# Patient Record
Sex: MALE | Race: ASIAN | NOT HISPANIC OR LATINO | URBAN - METROPOLITAN AREA
[De-identification: names, ages, dates, MRNs, and addresses within clinical notes are randomized per-mention and may not be internally consistent; named-entity substitution may affect disease eponyms.]

---

## 2023-08-13 ENCOUNTER — INPATIENT (INPATIENT)
Facility: HOSPITAL | Age: 73
LOS: 4 days | Discharge: HOME | DRG: 661 | End: 2023-08-18
Attending: HOSPITALIST | Admitting: STUDENT IN AN ORGANIZED HEALTH CARE EDUCATION/TRAINING PROGRAM
Payer: MEDICARE

## 2023-08-13 VITALS
WEIGHT: 175.05 LBS | SYSTOLIC BLOOD PRESSURE: 128 MMHG | RESPIRATION RATE: 20 BRPM | HEIGHT: 66 IN | TEMPERATURE: 98 F | DIASTOLIC BLOOD PRESSURE: 80 MMHG | OXYGEN SATURATION: 97 % | HEART RATE: 51 BPM

## 2023-08-13 LAB
ALBUMIN SERPL ELPH-MCNC: 4.9 G/DL — SIGNIFICANT CHANGE UP (ref 3.3–5)
ALP SERPL-CCNC: 80 U/L — SIGNIFICANT CHANGE UP (ref 40–120)
ALT FLD-CCNC: 29 U/L — SIGNIFICANT CHANGE UP (ref 10–45)
ANION GAP SERPL CALC-SCNC: 10 MMOL/L — SIGNIFICANT CHANGE UP (ref 5–17)
ANION GAP SERPL CALC-SCNC: 13 MMOL/L — SIGNIFICANT CHANGE UP (ref 5–17)
APPEARANCE UR: CLEAR — SIGNIFICANT CHANGE UP
AST SERPL-CCNC: 23 U/L — SIGNIFICANT CHANGE UP (ref 10–40)
BACTERIA # UR AUTO: NEGATIVE — SIGNIFICANT CHANGE UP
BASE EXCESS BLDV CALC-SCNC: -0.6 MMOL/L — SIGNIFICANT CHANGE UP (ref -2–3)
BASOPHILS # BLD AUTO: 0.03 K/UL — SIGNIFICANT CHANGE UP (ref 0–0.2)
BASOPHILS NFR BLD AUTO: 0.3 % — SIGNIFICANT CHANGE UP (ref 0–2)
BILIRUB SERPL-MCNC: 0.6 MG/DL — SIGNIFICANT CHANGE UP (ref 0.2–1.2)
BILIRUB UR-MCNC: NEGATIVE — SIGNIFICANT CHANGE UP
BUN SERPL-MCNC: 26 MG/DL — HIGH (ref 7–23)
BUN SERPL-MCNC: 26 MG/DL — HIGH (ref 7–23)
CA-I SERPL-SCNC: 1.35 MMOL/L — HIGH (ref 1.15–1.33)
CALCIUM SERPL-MCNC: 10.4 MG/DL — SIGNIFICANT CHANGE UP (ref 8.4–10.5)
CALCIUM SERPL-MCNC: 8.9 MG/DL — SIGNIFICANT CHANGE UP (ref 8.4–10.5)
CHLORIDE BLDV-SCNC: 105 MMOL/L — SIGNIFICANT CHANGE UP (ref 96–108)
CHLORIDE SERPL-SCNC: 105 MMOL/L — SIGNIFICANT CHANGE UP (ref 96–108)
CHLORIDE SERPL-SCNC: 109 MMOL/L — HIGH (ref 96–108)
CO2 BLDV-SCNC: 28 MMOL/L — HIGH (ref 22–26)
CO2 SERPL-SCNC: 22 MMOL/L — SIGNIFICANT CHANGE UP (ref 22–31)
CO2 SERPL-SCNC: 23 MMOL/L — SIGNIFICANT CHANGE UP (ref 22–31)
COD CRY URNS QL: ABNORMAL
COLOR SPEC: YELLOW — SIGNIFICANT CHANGE UP
CREAT SERPL-MCNC: 1.53 MG/DL — HIGH (ref 0.5–1.3)
CREAT SERPL-MCNC: 1.6 MG/DL — HIGH (ref 0.5–1.3)
DIFF PNL FLD: NEGATIVE — SIGNIFICANT CHANGE UP
EGFR: 45 ML/MIN/1.73M2 — LOW
EGFR: 48 ML/MIN/1.73M2 — LOW
EOSINOPHIL # BLD AUTO: 0.02 K/UL — SIGNIFICANT CHANGE UP (ref 0–0.5)
EOSINOPHIL NFR BLD AUTO: 0.2 % — SIGNIFICANT CHANGE UP (ref 0–6)
EPI CELLS # UR: 1 — SIGNIFICANT CHANGE UP
GAS PNL BLDV: 134 MMOL/L — LOW (ref 136–145)
GAS PNL BLDV: SIGNIFICANT CHANGE UP
GAS PNL BLDV: SIGNIFICANT CHANGE UP
GLUCOSE BLDV-MCNC: 114 MG/DL — HIGH (ref 70–99)
GLUCOSE SERPL-MCNC: 108 MG/DL — HIGH (ref 70–99)
GLUCOSE SERPL-MCNC: 110 MG/DL — HIGH (ref 70–99)
GLUCOSE UR QL: NEGATIVE — SIGNIFICANT CHANGE UP
HCO3 BLDV-SCNC: 27 MMOL/L — SIGNIFICANT CHANGE UP (ref 22–29)
HCT VFR BLD CALC: 47.4 % — SIGNIFICANT CHANGE UP (ref 39–50)
HCT VFR BLDA CALC: 47 % — SIGNIFICANT CHANGE UP (ref 39–51)
HGB BLD CALC-MCNC: 15.8 G/DL — SIGNIFICANT CHANGE UP (ref 12.6–17.4)
HGB BLD-MCNC: 15.5 G/DL — SIGNIFICANT CHANGE UP (ref 13–17)
IMM GRANULOCYTES NFR BLD AUTO: 0.5 % — SIGNIFICANT CHANGE UP (ref 0–0.9)
KETONES UR-MCNC: NEGATIVE — SIGNIFICANT CHANGE UP
LACTATE BLDV-MCNC: 1.4 MMOL/L — SIGNIFICANT CHANGE UP (ref 0.5–2)
LEUKOCYTE ESTERASE UR-ACNC: NEGATIVE — SIGNIFICANT CHANGE UP
LIDOCAIN IGE QN: 46 U/L — SIGNIFICANT CHANGE UP (ref 7–60)
LYMPHOCYTES # BLD AUTO: 0.93 K/UL — LOW (ref 1–3.3)
LYMPHOCYTES # BLD AUTO: 8.6 % — LOW (ref 13–44)
MCHC RBC-ENTMCNC: 30.3 PG — SIGNIFICANT CHANGE UP (ref 27–34)
MCHC RBC-ENTMCNC: 32.7 GM/DL — SIGNIFICANT CHANGE UP (ref 32–36)
MCV RBC AUTO: 92.6 FL — SIGNIFICANT CHANGE UP (ref 80–100)
MONOCYTES # BLD AUTO: 0.95 K/UL — HIGH (ref 0–0.9)
MONOCYTES NFR BLD AUTO: 8.8 % — SIGNIFICANT CHANGE UP (ref 2–14)
NEUTROPHILS # BLD AUTO: 8.78 K/UL — HIGH (ref 1.8–7.4)
NEUTROPHILS NFR BLD AUTO: 81.6 % — HIGH (ref 43–77)
NITRITE UR-MCNC: NEGATIVE — SIGNIFICANT CHANGE UP
NRBC # BLD: 0 /100 WBCS — SIGNIFICANT CHANGE UP (ref 0–0)
PCO2 BLDV: 53 MMHG — SIGNIFICANT CHANGE UP (ref 42–55)
PH BLDV: 7.31 — LOW (ref 7.32–7.43)
PH UR: 5.5 — SIGNIFICANT CHANGE UP (ref 5–8)
PLATELET # BLD AUTO: 211 K/UL — SIGNIFICANT CHANGE UP (ref 150–400)
PO2 BLDV: 21 MMHG — LOW (ref 25–45)
POTASSIUM BLDV-SCNC: 5 MMOL/L — SIGNIFICANT CHANGE UP (ref 3.5–5.1)
POTASSIUM SERPL-MCNC: 4.2 MMOL/L — SIGNIFICANT CHANGE UP (ref 3.5–5.3)
POTASSIUM SERPL-MCNC: 5 MMOL/L — SIGNIFICANT CHANGE UP (ref 3.5–5.3)
POTASSIUM SERPL-SCNC: 4.2 MMOL/L — SIGNIFICANT CHANGE UP (ref 3.5–5.3)
POTASSIUM SERPL-SCNC: 5 MMOL/L — SIGNIFICANT CHANGE UP (ref 3.5–5.3)
PROT SERPL-MCNC: 7.6 G/DL — SIGNIFICANT CHANGE UP (ref 6–8.3)
PROT UR-MCNC: ABNORMAL
RBC # BLD: 5.12 M/UL — SIGNIFICANT CHANGE UP (ref 4.2–5.8)
RBC # FLD: 12.3 % — SIGNIFICANT CHANGE UP (ref 10.3–14.5)
RBC CASTS # UR COMP ASSIST: 0 /HPF — SIGNIFICANT CHANGE UP (ref 0–4)
SAO2 % BLDV: 27.6 % — LOW (ref 67–88)
SODIUM SERPL-SCNC: 141 MMOL/L — SIGNIFICANT CHANGE UP (ref 135–145)
SODIUM SERPL-SCNC: 141 MMOL/L — SIGNIFICANT CHANGE UP (ref 135–145)
SP GR SPEC: 1.03 — HIGH (ref 1.01–1.02)
UROBILINOGEN FLD QL: SIGNIFICANT CHANGE UP
WBC # BLD: 10.76 K/UL — HIGH (ref 3.8–10.5)
WBC # FLD AUTO: 10.76 K/UL — HIGH (ref 3.8–10.5)
WBC UR QL: 1 /HPF — SIGNIFICANT CHANGE UP (ref 0–5)

## 2023-08-13 PROCEDURE — 74176 CT ABD & PELVIS W/O CONTRAST: CPT | Mod: 26,MA

## 2023-08-13 PROCEDURE — 71045 X-RAY EXAM CHEST 1 VIEW: CPT | Mod: 26

## 2023-08-13 PROCEDURE — 99285 EMERGENCY DEPT VISIT HI MDM: CPT

## 2023-08-13 RX ORDER — ONDANSETRON 8 MG/1
4 TABLET, FILM COATED ORAL ONCE
Refills: 0 | Status: COMPLETED | OUTPATIENT
Start: 2023-08-13 | End: 2023-08-13

## 2023-08-13 RX ORDER — ACETAMINOPHEN 500 MG
650 TABLET ORAL ONCE
Refills: 0 | Status: COMPLETED | OUTPATIENT
Start: 2023-08-13 | End: 2023-08-13

## 2023-08-13 RX ORDER — SODIUM CHLORIDE 9 MG/ML
1000 INJECTION INTRAMUSCULAR; INTRAVENOUS; SUBCUTANEOUS ONCE
Refills: 0 | Status: COMPLETED | OUTPATIENT
Start: 2023-08-13 | End: 2023-08-13

## 2023-08-13 RX ORDER — MORPHINE SULFATE 50 MG/1
4 CAPSULE, EXTENDED RELEASE ORAL ONCE
Refills: 0 | Status: DISCONTINUED | OUTPATIENT
Start: 2023-08-13 | End: 2023-08-13

## 2023-08-13 RX ORDER — KETOROLAC TROMETHAMINE 30 MG/ML
15 SYRINGE (ML) INJECTION ONCE
Refills: 0 | Status: DISCONTINUED | OUTPATIENT
Start: 2023-08-13 | End: 2023-08-13

## 2023-08-13 RX ADMIN — SODIUM CHLORIDE 1000 MILLILITER(S): 9 INJECTION INTRAMUSCULAR; INTRAVENOUS; SUBCUTANEOUS at 16:16

## 2023-08-13 RX ADMIN — Medication 15 MILLIGRAM(S): at 16:24

## 2023-08-13 RX ADMIN — SODIUM CHLORIDE 1000 MILLILITER(S): 9 INJECTION INTRAMUSCULAR; INTRAVENOUS; SUBCUTANEOUS at 19:59

## 2023-08-13 RX ADMIN — ONDANSETRON 4 MILLIGRAM(S): 8 TABLET, FILM COATED ORAL at 16:18

## 2023-08-13 RX ADMIN — Medication 650 MILLIGRAM(S): at 16:19

## 2023-08-13 RX ADMIN — MORPHINE SULFATE 4 MILLIGRAM(S): 50 CAPSULE, EXTENDED RELEASE ORAL at 16:23

## 2023-08-13 NOTE — ED ADULT NURSE NOTE - OBJECTIVE STATEMENT
73 year old male presenting to the ED left flank pain for the past 48hrs. states his pain is sharp and constant hes also complaining of  nausea and had 7 episodes of vomiting, last episode was at 3 am this morning. He denies chest pain, SOB, fever, chills, hematuria, dysuria or increased urgency. Language Line Tajik  Jenelle, [phone]

## 2023-08-13 NOTE — ED PROVIDER NOTE - PROGRESS NOTE DETAILS
POCUS exam showed mild hydronephrosis of left kidney suggestive of nephrolithiasis. Results of Non-Contrast CT AP pending. Madonna Alexis MD (PGY-2 EM): discussed with patient need for admission for HERLINDA, renal stone, angy.  june [id number]. patient had an episode of angy, does not know what htn medicine he is on. patient denies history of PE, on an IVC filter for DVTs.

## 2023-08-13 NOTE — ED PROVIDER NOTE - OBJECTIVE STATEMENT
AHSAN is a 73 year old male with past medical history of CAD, HTN, HLD presenting to the ED with 48 hour history of left flank pain. He describes the pain as sharp, was initially intermittent but now is constant. He has not taken anything for the pain and does notice anything that makes it worse. The pain has been associated with nausea and 7 episodes of emesis, last episode 3 am this morning. He denies chest pain, SOB, fever, chills, hematuria, dysuria or increased urgency. He has no history of kidney stone. Does not use alcohol or tobacco products. AHSAN is a 73 year old male with past medical history of CAD, HTN, HLD presenting to the ED with 48 hour history of left flank pain. He describes the pain as sharp, was initially intermittent but now is constant. He has not taken anything for the pain and does notice anything that makes it worse. The pain has been associated with nausea and 7 episodes of emesis, last episode 3 am this morning. He denies chest pain, SOB, fever, chills, hematuria, dysuria or increased urgency. He has no history of kidney stone. Does not use alcohol or tobacco products.  Interview performed with Language Line Mongolian  Jenelle, [phone]

## 2023-08-13 NOTE — ED PROVIDER NOTE - CLINICAL SUMMARY MEDICAL DECISION MAKING FREE TEXT BOX
This is a 73 year old male with past medical history of CAD, HTN and HLD coming to the ED with 48 hours of left flank pain. Symptoms are suggestive of nephrolithiasis at this time, differential diagnosis also includes pyelonephritis vs aortic pathology. Diagnostic workup will include CMP, CBC, urinalysis, urine culture, Retroperitoneal POCUS and Non-Contrast CT Abdomen/Pelvis. 1L bolus IV fluid. IV Toradol 15 mg, IV Morphine 4 mg, PO acetaminophen 650mg for pain control.  IV ondansetron for antiemesis. This is a 73 year old male with past medical history of CAD, HTN and HLD coming to the ED with 48 hours of left flank pain. Symptoms are suggestive of nephrolithiasis at this time, differential diagnosis also includes pyelonephritis vs aortic pathology. Diagnostic workup will include CMP, CBC, urinalysis, urine culture, Retroperitoneal POCUS and Non-Contrast CT Abdomen/Pelvis. 1L bolus IV fluid. IV Toradol 15 mg, IV Morphine 4 mg, PO acetaminophen 650mg for pain control.  IV ondansetron for antiemesis.    Nishant Alejandra MD, FACEP: In this physician's medical judgement based on clinical history and physical exam the patient's signs and symptoms lead to differential diagnoses which includes but is not limited to: ureterolithiases, pyelonephritis, colitis,     Historical features, symptoms, and clinical exam not consistent with: acs, aortic dissection    Labs were ordered and independently reviewed by me.  EKG was ordered and independently reviewed by me.  Imaging was ordered and reviewed by me.      Appropriate medications for the patient's presenting complaints were ordered, and effects were reassessed.     History assisted by   Escalation to admission/observation was considered.     Will follow up on labs, therapeutics, imaging, reassess and disposition as clinically indicated.  *The above represents an initial assessment/impression. Please refer to my progress notes below for potential changes in patient clinical course*

## 2023-08-13 NOTE — ED PROVIDER NOTE - NS ED ROS FT
CONSTITUTIONAL - No fever, No weight change, No lightheadedness  SKIN - No rash  HEMATOLOGIC - No abnormal bleeding or bruising  EYES - No eye pain, No blurred vision  ENT - No change in hearing, No sore throat, No neck pain, No rhinorrhea, No ear pain  RESPIRATORY - No shortness of breath, No cough  CARDIAC -No chest pain, No palpitations  GI - No abdominal pain, + vomiting, No diarrhea, No constipation  - No dysuria, no frequency, no hematuria.   MUSCULOSKELETAL - No joint pain, No swelling, + back pain  NEUROLOGIC - No numbness, No focal weakness, No headache, No dizziness

## 2023-08-13 NOTE — ED PROVIDER NOTE - CARE PLAN
1 Principal Discharge DX:	Kidney stone  Secondary Diagnosis:	HERLINDA (acute kidney injury)  Secondary Diagnosis:	Bradycardia

## 2023-08-14 ENCOUNTER — TRANSCRIPTION ENCOUNTER (OUTPATIENT)
Age: 73
End: 2023-08-14

## 2023-08-14 DIAGNOSIS — N17.9 ACUTE KIDNEY FAILURE, UNSPECIFIED: ICD-10-CM

## 2023-08-14 DIAGNOSIS — N20.0 CALCULUS OF KIDNEY: ICD-10-CM

## 2023-08-14 DIAGNOSIS — R00.1 BRADYCARDIA, UNSPECIFIED: ICD-10-CM

## 2023-08-14 DIAGNOSIS — Z95.828 PRESENCE OF OTHER VASCULAR IMPLANTS AND GRAFTS: Chronic | ICD-10-CM

## 2023-08-14 DIAGNOSIS — I10 ESSENTIAL (PRIMARY) HYPERTENSION: ICD-10-CM

## 2023-08-14 DIAGNOSIS — Z29.9 ENCOUNTER FOR PROPHYLACTIC MEASURES, UNSPECIFIED: ICD-10-CM

## 2023-08-14 LAB
ANION GAP SERPL CALC-SCNC: 10 MMOL/L — SIGNIFICANT CHANGE UP (ref 5–17)
APPEARANCE UR: ABNORMAL
BACTERIA # UR AUTO: NEGATIVE — SIGNIFICANT CHANGE UP
BASOPHILS # BLD AUTO: 0.02 K/UL — SIGNIFICANT CHANGE UP (ref 0–0.2)
BASOPHILS NFR BLD AUTO: 0.2 % — SIGNIFICANT CHANGE UP (ref 0–2)
BILIRUB UR-MCNC: NEGATIVE — SIGNIFICANT CHANGE UP
BUN SERPL-MCNC: 26 MG/DL — HIGH (ref 7–23)
CALCIUM SERPL-MCNC: 8.6 MG/DL — SIGNIFICANT CHANGE UP (ref 8.4–10.5)
CHLORIDE SERPL-SCNC: 111 MMOL/L — HIGH (ref 96–108)
CO2 SERPL-SCNC: 18 MMOL/L — LOW (ref 22–31)
COLOR SPEC: YELLOW — SIGNIFICANT CHANGE UP
CREAT ?TM UR-MCNC: 237 MG/DL — SIGNIFICANT CHANGE UP
CREAT SERPL-MCNC: 1.58 MG/DL — HIGH (ref 0.5–1.3)
CULTURE RESULTS: SIGNIFICANT CHANGE UP
DIFF PNL FLD: ABNORMAL
EGFR: 46 ML/MIN/1.73M2 — LOW
EOSINOPHIL # BLD AUTO: 0.06 K/UL — SIGNIFICANT CHANGE UP (ref 0–0.5)
EOSINOPHIL NFR BLD AUTO: 0.6 % — SIGNIFICANT CHANGE UP (ref 0–6)
EPI CELLS # UR: 2 /HPF — SIGNIFICANT CHANGE UP
GLUCOSE SERPL-MCNC: 79 MG/DL — SIGNIFICANT CHANGE UP (ref 70–99)
GLUCOSE UR QL: NEGATIVE — SIGNIFICANT CHANGE UP
GRAN CASTS # UR COMP ASSIST: 2 /LPF — SIGNIFICANT CHANGE UP
HCT VFR BLD CALC: 40.6 % — SIGNIFICANT CHANGE UP (ref 39–50)
HGB BLD-MCNC: 13.3 G/DL — SIGNIFICANT CHANGE UP (ref 13–17)
HYALINE CASTS # UR AUTO: 6 /LPF — SIGNIFICANT CHANGE UP (ref 0–7)
IMM GRANULOCYTES NFR BLD AUTO: 0.4 % — SIGNIFICANT CHANGE UP (ref 0–0.9)
KETONES UR-MCNC: NEGATIVE — SIGNIFICANT CHANGE UP
LEUKOCYTE ESTERASE UR-ACNC: ABNORMAL
LYMPHOCYTES # BLD AUTO: 1.27 K/UL — SIGNIFICANT CHANGE UP (ref 1–3.3)
LYMPHOCYTES # BLD AUTO: 13.6 % — SIGNIFICANT CHANGE UP (ref 13–44)
MAGNESIUM SERPL-MCNC: 2.2 MG/DL — SIGNIFICANT CHANGE UP (ref 1.6–2.6)
MCHC RBC-ENTMCNC: 30.6 PG — SIGNIFICANT CHANGE UP (ref 27–34)
MCHC RBC-ENTMCNC: 32.8 GM/DL — SIGNIFICANT CHANGE UP (ref 32–36)
MCV RBC AUTO: 93.3 FL — SIGNIFICANT CHANGE UP (ref 80–100)
MONOCYTES # BLD AUTO: 0.91 K/UL — HIGH (ref 0–0.9)
MONOCYTES NFR BLD AUTO: 9.8 % — SIGNIFICANT CHANGE UP (ref 2–14)
NEUTROPHILS # BLD AUTO: 7.03 K/UL — SIGNIFICANT CHANGE UP (ref 1.8–7.4)
NEUTROPHILS NFR BLD AUTO: 75.4 % — SIGNIFICANT CHANGE UP (ref 43–77)
NITRITE UR-MCNC: NEGATIVE — SIGNIFICANT CHANGE UP
NRBC # BLD: 0 /100 WBCS — SIGNIFICANT CHANGE UP (ref 0–0)
OSMOLALITY UR: 565 MOS/KG — SIGNIFICANT CHANGE UP (ref 300–900)
PH UR: 5.5 — SIGNIFICANT CHANGE UP (ref 5–8)
PHOSPHATE SERPL-MCNC: 2.3 MG/DL — LOW (ref 2.5–4.5)
PLATELET # BLD AUTO: 160 K/UL — SIGNIFICANT CHANGE UP (ref 150–400)
POTASSIUM SERPL-MCNC: 4.4 MMOL/L — SIGNIFICANT CHANGE UP (ref 3.5–5.3)
POTASSIUM SERPL-SCNC: 4.4 MMOL/L — SIGNIFICANT CHANGE UP (ref 3.5–5.3)
POTASSIUM UR-SCNC: 62 MMOL/L — SIGNIFICANT CHANGE UP
PROT ?TM UR-MCNC: 34 MG/DL — HIGH (ref 0–12)
PROT UR-MCNC: ABNORMAL
PROT/CREAT UR-RTO: 0.1 RATIO — SIGNIFICANT CHANGE UP (ref 0–0.2)
RBC # BLD: 4.35 M/UL — SIGNIFICANT CHANGE UP (ref 4.2–5.8)
RBC # FLD: 12.4 % — SIGNIFICANT CHANGE UP (ref 10.3–14.5)
RBC CASTS # UR COMP ASSIST: 44 /HPF — HIGH (ref 0–4)
SODIUM SERPL-SCNC: 139 MMOL/L — SIGNIFICANT CHANGE UP (ref 135–145)
SODIUM UR-SCNC: 40 MMOL/L — SIGNIFICANT CHANGE UP
SP GR SPEC: 1.02 — SIGNIFICANT CHANGE UP (ref 1.01–1.02)
SPECIMEN SOURCE: SIGNIFICANT CHANGE UP
TROPONIN T, HIGH SENSITIVITY RESULT: 13 NG/L — SIGNIFICANT CHANGE UP (ref 0–51)
UROBILINOGEN FLD QL: NEGATIVE — SIGNIFICANT CHANGE UP
UUN UR-MCNC: 794 MG/DL — SIGNIFICANT CHANGE UP
WBC # BLD: 9.33 K/UL — SIGNIFICANT CHANGE UP (ref 3.8–10.5)
WBC # FLD AUTO: 9.33 K/UL — SIGNIFICANT CHANGE UP (ref 3.8–10.5)
WBC UR QL: 8 /HPF — HIGH (ref 0–5)

## 2023-08-14 PROCEDURE — 99221 1ST HOSP IP/OBS SF/LOW 40: CPT

## 2023-08-14 PROCEDURE — 76775 US EXAM ABDO BACK WALL LIM: CPT | Mod: 26

## 2023-08-14 PROCEDURE — 99222 1ST HOSP IP/OBS MODERATE 55: CPT

## 2023-08-14 RX ORDER — HEPARIN SODIUM 5000 [USP'U]/ML
5000 INJECTION INTRAVENOUS; SUBCUTANEOUS EVERY 8 HOURS
Refills: 0 | Status: DISCONTINUED | OUTPATIENT
Start: 2023-08-14 | End: 2023-08-15

## 2023-08-14 RX ORDER — SODIUM CHLORIDE 9 MG/ML
1000 INJECTION INTRAMUSCULAR; INTRAVENOUS; SUBCUTANEOUS
Refills: 0 | Status: DISCONTINUED | OUTPATIENT
Start: 2023-08-14 | End: 2023-08-14

## 2023-08-14 RX ORDER — SODIUM CHLORIDE 9 MG/ML
1000 INJECTION INTRAMUSCULAR; INTRAVENOUS; SUBCUTANEOUS
Refills: 0 | Status: DISCONTINUED | OUTPATIENT
Start: 2023-08-14 | End: 2023-08-15

## 2023-08-14 RX ORDER — OXYCODONE HYDROCHLORIDE 5 MG/1
2.5 TABLET ORAL EVERY 6 HOURS
Refills: 0 | Status: DISCONTINUED | OUTPATIENT
Start: 2023-08-14 | End: 2023-08-14

## 2023-08-14 RX ORDER — ACETAMINOPHEN 500 MG
975 TABLET ORAL EVERY 6 HOURS
Refills: 0 | Status: DISCONTINUED | OUTPATIENT
Start: 2023-08-14 | End: 2023-08-14

## 2023-08-14 RX ORDER — OXYCODONE HYDROCHLORIDE 5 MG/1
2.5 TABLET ORAL EVERY 4 HOURS
Refills: 0 | Status: DISCONTINUED | OUTPATIENT
Start: 2023-08-14 | End: 2023-08-15

## 2023-08-14 RX ORDER — ATORVASTATIN CALCIUM 80 MG/1
40 TABLET, FILM COATED ORAL DAILY
Refills: 0 | Status: DISCONTINUED | OUTPATIENT
Start: 2023-08-14 | End: 2023-08-15

## 2023-08-14 RX ORDER — ACETAMINOPHEN 500 MG
975 TABLET ORAL EVERY 6 HOURS
Refills: 0 | Status: DISCONTINUED | OUTPATIENT
Start: 2023-08-14 | End: 2023-08-15

## 2023-08-14 RX ORDER — OXYCODONE HYDROCHLORIDE 5 MG/1
5 TABLET ORAL EVERY 4 HOURS
Refills: 0 | Status: DISCONTINUED | OUTPATIENT
Start: 2023-08-14 | End: 2023-08-15

## 2023-08-14 RX ORDER — TAMSULOSIN HYDROCHLORIDE 0.4 MG/1
0.4 CAPSULE ORAL AT BEDTIME
Refills: 0 | Status: DISCONTINUED | OUTPATIENT
Start: 2023-08-14 | End: 2023-08-14

## 2023-08-14 RX ORDER — TAMSULOSIN HYDROCHLORIDE 0.4 MG/1
0.8 CAPSULE ORAL AT BEDTIME
Refills: 0 | Status: DISCONTINUED | OUTPATIENT
Start: 2023-08-14 | End: 2023-08-15

## 2023-08-14 RX ADMIN — OXYCODONE HYDROCHLORIDE 2.5 MILLIGRAM(S): 5 TABLET ORAL at 11:20

## 2023-08-14 RX ADMIN — SODIUM CHLORIDE 150 MILLILITER(S): 9 INJECTION INTRAMUSCULAR; INTRAVENOUS; SUBCUTANEOUS at 14:07

## 2023-08-14 RX ADMIN — HEPARIN SODIUM 5000 UNIT(S): 5000 INJECTION INTRAVENOUS; SUBCUTANEOUS at 14:15

## 2023-08-14 RX ADMIN — SODIUM CHLORIDE 150 MILLILITER(S): 9 INJECTION INTRAMUSCULAR; INTRAVENOUS; SUBCUTANEOUS at 05:22

## 2023-08-14 RX ADMIN — OXYCODONE HYDROCHLORIDE 2.5 MILLIGRAM(S): 5 TABLET ORAL at 20:40

## 2023-08-14 RX ADMIN — HEPARIN SODIUM 5000 UNIT(S): 5000 INJECTION INTRAVENOUS; SUBCUTANEOUS at 22:42

## 2023-08-14 RX ADMIN — OXYCODONE HYDROCHLORIDE 5 MILLIGRAM(S): 5 TABLET ORAL at 23:22

## 2023-08-14 RX ADMIN — ATORVASTATIN CALCIUM 40 MILLIGRAM(S): 80 TABLET, FILM COATED ORAL at 14:11

## 2023-08-14 RX ADMIN — OXYCODONE HYDROCHLORIDE 2.5 MILLIGRAM(S): 5 TABLET ORAL at 10:51

## 2023-08-14 RX ADMIN — OXYCODONE HYDROCHLORIDE 2.5 MILLIGRAM(S): 5 TABLET ORAL at 21:40

## 2023-08-14 RX ADMIN — OXYCODONE HYDROCHLORIDE 5 MILLIGRAM(S): 5 TABLET ORAL at 14:06

## 2023-08-14 RX ADMIN — OXYCODONE HYDROCHLORIDE 5 MILLIGRAM(S): 5 TABLET ORAL at 14:35

## 2023-08-14 RX ADMIN — HEPARIN SODIUM 5000 UNIT(S): 5000 INJECTION INTRAVENOUS; SUBCUTANEOUS at 05:50

## 2023-08-14 RX ADMIN — TAMSULOSIN HYDROCHLORIDE 0.8 MILLIGRAM(S): 0.4 CAPSULE ORAL at 22:42

## 2023-08-14 NOTE — PROGRESS NOTE ADULT - SUBJECTIVE AND OBJECTIVE BOX
Subjectiv  Patient seen and examined with Kinyarwanda . Pain better controlled with medication. Denies nausea or vomiting.    Objective    Vital signs  T(F): , Max: 98.2 (08-14-23 @ 12:26)  HR: 58 (08-14-23 @ 12:26)  BP: 137/74 (08-14-23 @ 12:26)  SpO2: 95% (08-14-23 @ 12:26)  Wt(kg): --    Output       Gen: NAD  Abd: soft, nontender, nondistended, mild L CVA tenderness  : Voiding spontaneously      Labs      08-14 @ 13:47    WBC 9.33  / Hct 40.6  / SCr 1.58     08-13 @ 21:16    WBC --    / Hct --    / SCr 1.53

## 2023-08-14 NOTE — CONSULT NOTE ADULT - ASSESSMENT
73y Male past medical history of CAD, HTN, HLD presenting to the ED with 48 hour history of left flank pain, found to have 3mm L distal stone with mild hydro and HERLINDA.  -Trial of medical expulsive therapy  -Flomax  -analgesia/anti-emetics prn  -hydration  -Strain urine for calculi  -please make patient npo in case procedure is indicated  -trend Cr in AM  -f/u urine culture

## 2023-08-14 NOTE — PROGRESS NOTE ADULT - ASSESSMENT
73y Male past medical history of CAD, HTN, HLD presenting to the ED with 48 hour history of left flank pain, found to have 3mm L distal stone with mild hydro and HERLINDA.    - Trend Cr - please obtain morning labs  - Added on for L ureteral stent placement vs. ureteroscopy tomorrow  - Continue Flomax  - Analgesia/anti-emetics prn  - Hydration  - Strain urine for calculi  - NPO after midnight  - F/u urine culture

## 2023-08-14 NOTE — H&P ADULT - NSHPLABSRESULTS_GEN_ALL_CORE
141  |  109<H>  |  26<H>  ----------------------------<  108<H>  4.2   |  22  |  1.53<H>      141  |  105  |  26<H>  ----------------------------<  110<H>  5.0   |  23  |  1.60<H>    Ca    8.9      13 Aug 2023 21:16  Ca    10.4      13 Aug 2023 16:33  Mg     2.4         TPro  7.6  /  Alb  4.9  /  TBili  0.6  /  DBili  x   /  AST  23  /  ALT  29  /  AlkPhos  80      Magnesium: 2.4 mg/dL (23 @ 21:16)                      Urinalysis Basic - ( 14 Aug 2023 03:21 )    Color: Yellow / Appearance: Slightly Turbid / S.022 / pH: x  Gluc: x / Ketone: Negative  / Bili: Negative / Urobili: Negative   Blood: x / Protein: Trace / Nitrite: Negative   Leuk Esterase: Small / RBC: 44 /hpf / WBC 8 /HPF   Sq Epi: x / Non Sq Epi: x / Bacteria: Negative                              15.5   10.76 )-----------( 211      ( 13 Aug 2023 16:33 )             47.4     CAPILLARY BLOOD GLUCOSE        Blood Gas Source Venous: Venous (23 @ 16:27)    IMAGING    < from: CT Abdomen and Pelvis No Cont (23 @ 23:38) >    FINDINGS:  LOWER CHEST: Scattered areas of subsegmental atelectasis bilaterally.   Aortic valvular calcifications.    Evaluation abdominopelvic viscera is limited without the administration   of IV contrast.    LIVER: Steatosis.  BILE DUCTS: Normal caliber.  GALLBLADDER: Cholelithiasis.  SPLEEN: Within normal limits.  PANCREAS: Punctate calcification in the pancreatic head.  ADRENALS: Within normal limits.  KIDNEYS/URETERS: Mild left hydroureteronephrosis secondary to a 3 mm   calculus within the distal left ureter, just proximal to the   ureterovesicular junction. Mild left perinephric stranding. No additional   nephrolithiasis or right-sided hydronephrosis.    BLADDER: Within normal limits.  REPRODUCTIVE ORGANS: Prostate is enlarged.    BOWEL: No bowel obstruction. Appendix is normal.  PERITONEUM: No ascites.  VESSELS: Left common iliac stent extends to level of the IVC. Infrarenal   IVC filter. Mild atherosclerotic disease.  RETROPERITONEUM/LYMPH NODES: No lymphadenopathy.  ABDOMINAL WALL: Within normal limits.  BONES: Degenerative changes.    IMPRESSION:    3 mm obstructing distal left ureteral calculus with associated mild left   hydroureteronephrosis.    < end of copied text >

## 2023-08-14 NOTE — H&P ADULT - PROBLEM SELECTOR PLAN 2
SCr 1.60 --> 1.53, likely due to obstructing stone vs hypovolemia from multiple episodes of vomiting prior.   - Trend BMP, SCr  - Avoid nephrotoxic meds, dose medications renally  - Monitor I/Os

## 2023-08-14 NOTE — H&P ADULT - PROBLEM SELECTOR PLAN 4
Hx of hypertension. Unclear which medications patient takes at home   - Per surescripts review, patient seems to be taking enalapril Hx of hypertension. Unclear which medications patient takes at home   - Per surescripts review, patient seems to be taking enalapril  - BP currently appropriate. Can monitor off antihypertensives for now until med rec complete.

## 2023-08-14 NOTE — H&P ADULT - ASSESSMENT
73 M, German speaking, hx of HLD, HTN, gout, DVT s/p IVC filter, presenting with L flank pain, found to have 3mm obstructing stone in distal L ureter with mild L hydronephrosis. Pt admitted for pain control and management of HERLINDA 2/2 obstructing stone.

## 2023-08-14 NOTE — H&P ADULT - HISTORY OF PRESENT ILLNESS
73 M, Danish speaking, hx of HLD, HTN, gout, presenting to ED with complaints of L flank pain for the past 2 days. Pain is sharp and constant, associated with nausea and several episodes of vomiting. Last episode of vomiting was yesterday AM. No blood in vomit. Denies fevers, chest pain, SOB, constipation, diarrhea. lightheadedness. Pt denies smoking or alcohol use. Pt has a history of gout and takes medications for it, but does not remember the names. Gout is localized to big toe on R foot where he has swelling and tenderness of that first joint. No previous instances of kidney stones. Pt says in 2015, he was hospitalized for "blood clots" and required "vein surgery". He is not currently on any blood thinners. Pain is now improved from prior, no longer nauseous.     In ED, received morphine, ketorolac, tylenol for pain control; zofran for nausea. CT A/P showed 3mm obstructing stone in distal ureter with associated L mild hydronephrosis.  73 M, Urdu speaking, hx of HLD, HTN, gout, DVT s/p IVC filter, presenting to ED with complaints of L flank pain for the past 2 days. Pain is sharp and constant, associated with nausea and several episodes of vomiting. Last episode of vomiting was yesterday AM. No blood in vomit. Denies fevers, chest pain, SOB, constipation, diarrhea. lightheadedness. Pt denies smoking or alcohol use. Pt has a history of gout and takes medications for it, but does not remember the names. Gout is localized to big toe on R foot where he has swelling and tenderness of that first joint. No previous instances of kidney stones. Pt says in 2015, he was hospitalized for "blood clots" and required "vein surgery". He is not currently on any blood thinners. Pain is now improved from prior, no longer nauseous.     In ED, received morphine, ketorolac, tylenol for pain control; zofran for nausea. CT A/P showed 3mm obstructing stone in distal ureter with associated L mild hydronephrosis.

## 2023-08-14 NOTE — H&P ADULT - NSHPREVIEWOFSYSTEMS_GEN_ALL_CORE
Review of Systems:   CONSTITUTIONAL: No fever, weight loss  EYES: No eye pain, visual disturbances, or discharge  ENMT:  No difficulty hearing, tinnitus, vertigo; No sinus or throat pain  RESPIRATORY: No SOB. No cough, wheezing, chills or hemoptysis  CARDIOVASCULAR: No chest pain, palpitations, dizziness, or leg swelling  GASTROINTESTINAL: L flank pain. + nausea, vomiting (improved), No hematemesis; No diarrhea or constipation. No melena or hematochezia.  GENITOURINARY: No dysuria, frequency, hematuria, or incontinence  NEUROLOGICAL: No headaches, memory loss, loss of strength, numbness, or tremors  SKIN: No itching, burning, rashes, or lesions   MUSCULOSKELETAL: +joint pain in base of first toe on R foot with swelling; No muscle, back pain  HEME/LYMPH: No easy bruising, or bleeding gums

## 2023-08-14 NOTE — H&P ADULT - PROBLEM SELECTOR PLAN 1
CT findings show obstructing 3mm stone with mild hydronephrosis.  - Initial UA showing few calcium oxalate crystals; repeat UA showing large blood   - Urology consulted given obstructing stone and HERLINDA. Discussed with urology; plan for medical expulsive therapy  - IVF hydration  - Start flomax  - Will keep NPO for now in case need for urological intervention  - Pain control with tylenol. Oxycodone if severe. Avoid NSAIDs, morphine for now given HERLINDA.

## 2023-08-14 NOTE — H&P ADULT - PROBLEM SELECTOR PLAN 5
DVT ppx: heparin subq  Diet: NPO except meds in case possible procedure; Resume DASH diet when cleared.  Dispo: pending improvement; not anticipating any home or PT needs    Medication reconciliation is incomplete. Need to confirm medications with pharmacy. [facility] [phone].

## 2023-08-14 NOTE — PATIENT PROFILE ADULT - FALL HARM RISK - HARM RISK INTERVENTIONS

## 2023-08-14 NOTE — H&P ADULT - PROBLEM SELECTOR PLAN 3
Pt bradycardic to HR 49 at times. Asymptomatic. Not on any silke blocking agents.   - EKG personally reviewed: showing sinus bradycardia, HR 49. No evidence of heart block, No ST or T wave abnormalities.   - monitor on telemetry

## 2023-08-14 NOTE — H&P ADULT - NSICDXPASTMEDICALHX_GEN_ALL_CORE_FT
PAST MEDICAL HISTORY:  CAD (coronary artery disease)     Gout     HTN (hypertension)     Hyperlipemia

## 2023-08-14 NOTE — H&P ADULT - NSHPPHYSICALEXAM_GEN_ALL_CORE
Vital Signs Last 24 Hrs  T(C): 36.7 (13 Aug 2023 19:40), Max: 36.9 (13 Aug 2023 14:17)  T(F): 98 (13 Aug 2023 19:40), Max: 98.5 (13 Aug 2023 14:17)  HR: 50 (14 Aug 2023 02:32) (47 - 54)  BP: 141/82 (14 Aug 2023 02:32) (128/80 - 141/82)  BP(mean): 78 (13 Aug 2023 17:30) (78 - 83)  RR: 16 (14 Aug 2023 02:32) (16 - 20)  SpO2: 99% (14 Aug 2023 02:32) (97% - 100%)    Parameters below as of 14 Aug 2023 02:32  Patient On (Oxygen Delivery Method): room air        CONSTITUTIONAL: Well-groomed, in no apparent distress  EYES: No conjunctival or scleral injection, non-icteric; PERRLA and symmetric  ENMT: No external nasal lesions; nasal mucosa not inflamed; normal dentition; no pharyngeal injection or exudates, oral mucosa with moist membranes  RESPIRATORY: Breathing comfortably; lungs CTA without wheeze/rhonchi/rales  CARDIOVASCULAR: bradycardic,+S1S2, no M/G/R; no carotid bruits; pedal pulses full and symmetric; trace lower extremity edema  GASTROINTESTINAL: No palpable masses or tenderness, +BS throughout, no rebound/guarding; no hepatosplenomegaly; no hernia palpated, mild tenderness of L flank  LYMPHATIC: No cervical LAD or tenderness  MUSCULOSKELETAL: Swelling and tenderness of first metatarsal joint, no fluctuance or erythema. No malalignment of extremities. No other joint swellings  SKIN: No rashes or ulcers noted  NEUROLOGIC: CN grossly intact; sensation intact in LEs b/l to light touch; strength and tone normal  PSYCHIATRIC: A+O x 3

## 2023-08-14 NOTE — H&P ADULT - NSHPSOCIALHISTORY_GEN_ALL_CORE
Social History:    Marital Status:  ( X  )    (   ) Single    (   )    (  )   Occupation: n/a  Lives with: (  ) alone  (  ) children   ( X ) spouse   (  ) parents  (  ) other    Substance Use (street drugs): ( X ) never used  (  ) other:  Tobacco Usage:  ( X  ) never smoked   (   ) former smoker   (   ) current smoker  (     ) pack year  (        ) last cigarette date  Alcohol Usage: none

## 2023-08-15 DIAGNOSIS — I25.10 ATHEROSCLEROTIC HEART DISEASE OF NATIVE CORONARY ARTERY WITHOUT ANGINA PECTORIS: ICD-10-CM

## 2023-08-15 LAB
ANION GAP SERPL CALC-SCNC: 10 MMOL/L — SIGNIFICANT CHANGE UP (ref 5–17)
BUN SERPL-MCNC: 23 MG/DL — SIGNIFICANT CHANGE UP (ref 7–23)
CALCIUM SERPL-MCNC: 8.4 MG/DL — SIGNIFICANT CHANGE UP (ref 8.4–10.5)
CHLORIDE SERPL-SCNC: 110 MMOL/L — HIGH (ref 96–108)
CO2 SERPL-SCNC: 19 MMOL/L — LOW (ref 22–31)
CREAT SERPL-MCNC: 1.64 MG/DL — HIGH (ref 0.5–1.3)
EGFR: 44 ML/MIN/1.73M2 — LOW
GLUCOSE SERPL-MCNC: 75 MG/DL — SIGNIFICANT CHANGE UP (ref 70–99)
HCT VFR BLD CALC: 41 % — SIGNIFICANT CHANGE UP (ref 39–50)
HCV AB S/CO SERPL IA: 0.06 S/CO — SIGNIFICANT CHANGE UP (ref 0–0.99)
HCV AB SERPL-IMP: SIGNIFICANT CHANGE UP
HGB BLD-MCNC: 13 G/DL — SIGNIFICANT CHANGE UP (ref 13–17)
MCHC RBC-ENTMCNC: 30 PG — SIGNIFICANT CHANGE UP (ref 27–34)
MCHC RBC-ENTMCNC: 31.7 GM/DL — LOW (ref 32–36)
MCV RBC AUTO: 94.7 FL — SIGNIFICANT CHANGE UP (ref 80–100)
NRBC # BLD: 0 /100 WBCS — SIGNIFICANT CHANGE UP (ref 0–0)
PLATELET # BLD AUTO: 169 K/UL — SIGNIFICANT CHANGE UP (ref 150–400)
POTASSIUM SERPL-MCNC: 4.7 MMOL/L — SIGNIFICANT CHANGE UP (ref 3.5–5.3)
POTASSIUM SERPL-SCNC: 4.7 MMOL/L — SIGNIFICANT CHANGE UP (ref 3.5–5.3)
RBC # BLD: 4.33 M/UL — SIGNIFICANT CHANGE UP (ref 4.2–5.8)
RBC # FLD: 11.9 % — SIGNIFICANT CHANGE UP (ref 10.3–14.5)
SODIUM SERPL-SCNC: 139 MMOL/L — SIGNIFICANT CHANGE UP (ref 135–145)
WBC # BLD: 7.15 K/UL — SIGNIFICANT CHANGE UP (ref 3.8–10.5)
WBC # FLD AUTO: 7.15 K/UL — SIGNIFICANT CHANGE UP (ref 3.8–10.5)

## 2023-08-15 PROCEDURE — 99233 SBSQ HOSP IP/OBS HIGH 50: CPT

## 2023-08-15 PROCEDURE — 74420 UROGRAPHY RTRGR +-KUB: CPT | Mod: 26

## 2023-08-15 PROCEDURE — 52332 CYSTOSCOPY AND TREATMENT: CPT | Mod: LT

## 2023-08-15 DEVICE — STENT URET INLAY OPTIMA 6FRX24CM: Type: IMPLANTABLE DEVICE | Site: LEFT | Status: FUNCTIONAL

## 2023-08-15 DEVICE — GUIDEWIRE SENSOR DUAL-FLEX NITINOL STRAIGHT .035" X 150CM: Type: IMPLANTABLE DEVICE | Site: LEFT | Status: FUNCTIONAL

## 2023-08-15 DEVICE — URETERAL CATH OPEN END 5FR 70CM: Type: IMPLANTABLE DEVICE | Site: LEFT | Status: FUNCTIONAL

## 2023-08-15 RX ORDER — OXYBUTYNIN CHLORIDE 5 MG
5 TABLET ORAL ONCE
Refills: 0 | Status: COMPLETED | OUTPATIENT
Start: 2023-08-15 | End: 2023-08-15

## 2023-08-15 RX ORDER — OXYCODONE HYDROCHLORIDE 5 MG/1
5 TABLET ORAL EVERY 4 HOURS
Refills: 0 | Status: DISCONTINUED | OUTPATIENT
Start: 2023-08-15 | End: 2023-08-18

## 2023-08-15 RX ORDER — DIAZEPAM 5 MG
2 TABLET ORAL ONCE
Refills: 0 | Status: DISCONTINUED | OUTPATIENT
Start: 2023-08-15 | End: 2023-08-18

## 2023-08-15 RX ORDER — ACETAMINOPHEN 500 MG
975 TABLET ORAL EVERY 6 HOURS
Refills: 0 | Status: DISCONTINUED | OUTPATIENT
Start: 2023-08-15 | End: 2023-08-18

## 2023-08-15 RX ORDER — CEFTRIAXONE 500 MG/1
1000 INJECTION, POWDER, FOR SOLUTION INTRAMUSCULAR; INTRAVENOUS EVERY 24 HOURS
Refills: 0 | Status: DISCONTINUED | OUTPATIENT
Start: 2023-08-16 | End: 2023-08-18

## 2023-08-15 RX ORDER — TAMSULOSIN HYDROCHLORIDE 0.4 MG/1
0.4 CAPSULE ORAL AT BEDTIME
Refills: 0 | Status: DISCONTINUED | OUTPATIENT
Start: 2023-08-15 | End: 2023-08-18

## 2023-08-15 RX ORDER — CHLORHEXIDINE GLUCONATE 213 G/1000ML
1 SOLUTION TOPICAL
Refills: 0 | Status: DISCONTINUED | OUTPATIENT
Start: 2023-08-15 | End: 2023-08-15

## 2023-08-15 RX ORDER — OXYCODONE HYDROCHLORIDE 5 MG/1
5 TABLET ORAL ONCE
Refills: 0 | Status: DISCONTINUED | OUTPATIENT
Start: 2023-08-15 | End: 2023-08-15

## 2023-08-15 RX ORDER — CEFTRIAXONE 500 MG/1
INJECTION, POWDER, FOR SOLUTION INTRAMUSCULAR; INTRAVENOUS
Refills: 0 | Status: DISCONTINUED | OUTPATIENT
Start: 2023-08-15 | End: 2023-08-18

## 2023-08-15 RX ORDER — HEPARIN SODIUM 5000 [USP'U]/ML
5000 INJECTION INTRAVENOUS; SUBCUTANEOUS EVERY 8 HOURS
Refills: 0 | Status: DISCONTINUED | OUTPATIENT
Start: 2023-08-15 | End: 2023-08-18

## 2023-08-15 RX ORDER — FENTANYL CITRATE 50 UG/ML
25 INJECTION INTRAVENOUS
Refills: 0 | Status: DISCONTINUED | OUTPATIENT
Start: 2023-08-15 | End: 2023-08-15

## 2023-08-15 RX ORDER — CEFTRIAXONE 500 MG/1
1000 INJECTION, POWDER, FOR SOLUTION INTRAMUSCULAR; INTRAVENOUS ONCE
Refills: 0 | Status: COMPLETED | OUTPATIENT
Start: 2023-08-15 | End: 2023-08-15

## 2023-08-15 RX ORDER — ATORVASTATIN CALCIUM 80 MG/1
40 TABLET, FILM COATED ORAL DAILY
Refills: 0 | Status: DISCONTINUED | OUTPATIENT
Start: 2023-08-15 | End: 2023-08-18

## 2023-08-15 RX ADMIN — HEPARIN SODIUM 5000 UNIT(S): 5000 INJECTION INTRAVENOUS; SUBCUTANEOUS at 21:49

## 2023-08-15 RX ADMIN — Medication 5 MILLIGRAM(S): at 21:49

## 2023-08-15 RX ADMIN — OXYCODONE HYDROCHLORIDE 5 MILLIGRAM(S): 5 TABLET ORAL at 01:00

## 2023-08-15 RX ADMIN — OXYCODONE HYDROCHLORIDE 2.5 MILLIGRAM(S): 5 TABLET ORAL at 05:12

## 2023-08-15 RX ADMIN — OXYCODONE HYDROCHLORIDE 5 MILLIGRAM(S): 5 TABLET ORAL at 10:25

## 2023-08-15 RX ADMIN — HEPARIN SODIUM 5000 UNIT(S): 5000 INJECTION INTRAVENOUS; SUBCUTANEOUS at 05:12

## 2023-08-15 RX ADMIN — OXYCODONE HYDROCHLORIDE 5 MILLIGRAM(S): 5 TABLET ORAL at 05:36

## 2023-08-15 RX ADMIN — OXYCODONE HYDROCHLORIDE 5 MILLIGRAM(S): 5 TABLET ORAL at 04:36

## 2023-08-15 RX ADMIN — OXYCODONE HYDROCHLORIDE 5 MILLIGRAM(S): 5 TABLET ORAL at 11:20

## 2023-08-15 RX ADMIN — CEFTRIAXONE 100 MILLIGRAM(S): 500 INJECTION, POWDER, FOR SOLUTION INTRAMUSCULAR; INTRAVENOUS at 19:37

## 2023-08-15 RX ADMIN — OXYCODONE HYDROCHLORIDE 2.5 MILLIGRAM(S): 5 TABLET ORAL at 06:42

## 2023-08-15 RX ADMIN — OXYCODONE HYDROCHLORIDE 5 MILLIGRAM(S): 5 TABLET ORAL at 00:22

## 2023-08-15 RX ADMIN — TAMSULOSIN HYDROCHLORIDE 0.4 MILLIGRAM(S): 0.4 CAPSULE ORAL at 21:49

## 2023-08-15 RX ADMIN — ATORVASTATIN CALCIUM 40 MILLIGRAM(S): 80 TABLET, FILM COATED ORAL at 12:12

## 2023-08-15 RX ADMIN — OXYCODONE HYDROCHLORIDE 5 MILLIGRAM(S): 5 TABLET ORAL at 09:06

## 2023-08-15 NOTE — PROGRESS NOTE ADULT - ASSESSMENT
A/P: 73y Male s/p L stent     -DVT prophylaxis/OOB  -Incentive spirometry  -Strict I&O's  -Analgesia and antiemetics as needed  -Regular Diet  -Meyer   -Care as per primary team  A/P: 73y Male s/p L stent     -DVT prophylaxis/OOB  -Incentive spirometry  -Strict I&O's  -Analgesia and antiemetics as needed  -Regular Diet  -Meyer   -F/u OR culture   -Care as per primary team

## 2023-08-15 NOTE — PROGRESS NOTE ADULT - SUBJECTIVE AND OBJECTIVE BOX
[facility]  Blanca Ramos,   [employer]    Patient is a 73y old  Male who presents with a chief complaint of Nephrolithiasis, HERLINDA (15 Aug 2023 07:40)        SUBJECTIVE / OVERNIGHT EVENTS: Sinhala  ID#[id number]. patient c/o left flank pain. The patient states he has had a stent before but could not recall if it was cardiac stent. He did however, state that he had chest pain in the past prior to the "stent" and since then (2015) has not had any further chest pain. He states he can walk up ~14 stairs (in his house) without chest pain or sob. He cannot recall any recent cardiac tests. The patient has had anesthesia in the past without any reaction. no history of easy bruising or bleeding.       MEDICATIONS  (STANDING):  atorvastatin 40 milliGRAM(s) Oral daily  heparin   Injectable 5000 Unit(s) SubCutaneous every 8 hours  sodium chloride 0.9%. 1000 milliLiter(s) (150 mL/Hr) IV Continuous <Continuous>  tamsulosin 0.8 milliGRAM(s) Oral at bedtime    MEDICATIONS  (PRN):  acetaminophen     Tablet .. 975 milliGRAM(s) Oral every 6 hours PRN Mild Pain (1 - 3)  oxyCODONE    IR 5 milliGRAM(s) Oral every 4 hours PRN Severe Pain (7 - 10)  oxyCODONE    IR 2.5 milliGRAM(s) Oral every 4 hours PRN Moderate Pain (4 - 6)      Vital Signs Last 24 Hrs  T(C): 36.7 (15 Aug 2023 11:04), Max: 37.1 (14 Aug 2023 18:34)  T(F): 98 (15 Aug 2023 11:04), Max: 98.8 (14 Aug 2023 18:34)  HR: 55 (15 Aug 2023 11:04) (48 - 59)  BP: 139/74 (15 Aug 2023 11:04) (137/74 - 154/71)  BP(mean): --  RR: 18 (15 Aug 2023 11:04) (18 - 18)  SpO2: 94% (15 Aug 2023 11:04) (94% - 98%)    Parameters below as of 15 Aug 2023 11:04  Patient On (Oxygen Delivery Method): room air      CAPILLARY BLOOD GLUCOSE        I&O's Summary    14 Aug 2023 07:01  -  15 Aug 2023 07:00  --------------------------------------------------------  IN: 0 mL / OUT: 0 mL / NET: 0 mL    15 Aug 2023 07:01  -  15 Aug 2023 12:00  --------------------------------------------------------  IN: 0 mL / OUT: 0 mL / NET: 0 mL        PHYSICAL EXAM:  GENERAL: NAD, breathing normal  HEAD:  Atraumatic, Normocephalic  EYES: conjunctiva and sclera clear  NECK: supple, No JVD  CHEST/LUNG: CTA b/l, +left cva tenderness  HEART: S1 S2 RRR  ABDOMEN: +BS Soft, NT/ND  EXTREMITIES:  2+ DP Pulses, No c/c. no LE edema  NEUROLOGY: AAOx3, no facial droop, moving all extremities   SKIN: No rashes or lesions    LABS:                        13.0   7.15  )-----------( 169      ( 15 Aug 2023 10:57 )             41.0     08-15    139  |  110<H>  |  23  ----------------------------<  75  4.7   |  19<L>  |  1.64<H>    Ca    8.4      15 Aug 2023 10:57  Phos  2.3     08-14  Mg     2.2     08-14    TPro  7.6  /  Alb  4.9  /  TBili  0.6  /  DBili  x   /  AST  23  /  ALT  29  /  AlkPhos  80  08-13          Urinalysis Basic - ( 15 Aug 2023 10:57 )    Color: x / Appearance: x / SG: x / pH: x  Gluc: 75 mg/dL / Ketone: x  / Bili: x / Urobili: x   Blood: x / Protein: x / Nitrite: x   Leuk Esterase: x / RBC: x / WBC x   Sq Epi: x / Non Sq Epi: x / Bacteria: x        RADIOLOGY & ADDITIONAL TESTS:    Imaging Personally Reviewed:  Consultant(s) Notes Reviewed:    Care Discussed with Consultants/Other Providers:

## 2023-08-15 NOTE — PROGRESS NOTE ADULT - SUBJECTIVE AND OBJECTIVE BOX
Subjective  No acute events overnight. Reports continued flank pain. Afebrile overnight. Denies nausea.    Objective    Vital signs  T(F): , Max: 98.8 (08-14-23 @ 18:34)  HR: 59 (08-15-23 @ 04:31)  BP: 154/71 (08-15-23 @ 04:31)  SpO2: 98% (08-15-23 @ 04:31)  Wt(kg): --    Output         Gen: NAD  Abd: soft, nontender, nondistended, no CVA tenderness  : Voiding spontaneously    Labs      08-14 @ 13:47    WBC 9.33  / Hct 40.6  / SCr 1.58     08-13 @ 21:16    WBC --    / Hct --    / SCr 1.53         Culture - Urine (collected 08-13-23 @ 19:23)  Source: Clean Catch Clean Catch (Midstream)  Final Report (08-14-23 @ 19:20):    <10,000 CFU/mL Normal Urogenital [place]ba

## 2023-08-15 NOTE — PROGRESS NOTE ADULT - ASSESSMENT
73y Male past medical history of CAD, HTN, HLD presenting to the ED with 48 hour history of left flank pain, found to have 3mm L distal stone with mild hydro and HERLINDA.    - AM labs pending  - OR today for L stent vs. ureteroscopy  - Urine culture NGUF  - Continue Flomax  - Patient consented  - NPO 73y Male past medical history of CAD, HTN, HLD presenting to the ED with 48 hour history of left flank pain, found to have 3mm L distal stone with mild hydro and HERLINDA.    - AM labs pending  - OR today for L stent vs. ureteroscopy  - Urine culture NGUF  - Continue Flomax  - Patient consented  - NPO  - Please document medical clearance

## 2023-08-15 NOTE — PROGRESS NOTE ADULT - PROBLEM SELECTOR PLAN 1
CT findings show obstructing 3mm stone with mild hydronephrosis.  - Initial UA showing few calcium oxalate crystals; repeat UA showing large blood   - Urology consulted appreciated - plan for L stent vs. ureteroscopy today - EKG without acute ischemic changes, troponin 13. no active chest pain, sob, no signs of volume overload, no significant ectopy on telemetry. METS>4. RCRI 0 with 39% 30 day risk of death, MI, or cardiac arrest. no contraindication to planned surgery.   -NPO for procedure  - IVF hydration  - c/w flomax  - Pain control with tylenol, Oxycodone prn. Avoid NSAIDs, morphine for now given HERLINDA.

## 2023-08-15 NOTE — PROGRESS NOTE ADULT - PROBLEM SELECTOR PLAN 3
should start asa 81mg qd if h/h stable post procedure.  suspect he had previous stent in 2015  no active cardiac symptoms

## 2023-08-15 NOTE — PROGRESS NOTE ADULT - ASSESSMENT
A/P: 73y Male s/p L stent  follow up urine cultures  started on CTX given urine appears purulent  monitor for fever, if febrile would get blood cultures  DVT prophylaxis/OOB  Incentive spirometry  Strict I&O's  Analgesia and antiemetics as needed  Diet  AM labs  keep castaneda until AM, will remove for TOV if afebrile

## 2023-08-15 NOTE — PROGRESS NOTE ADULT - ATTENDING COMMENTS
Pt seen and examined in ER. Pain improved right now but has had issues with pain control. Cr relatively stable. Plan for OR tomorrow for stent.
Pt seen and examined prior to OR. Still with L pain. Plan on OR today for probable ureteroscopy as urine cx negative.

## 2023-08-15 NOTE — PROGRESS NOTE ADULT - SUBJECTIVE AND OBJECTIVE BOX
Post op Check    Pt seen and examined without complaints. Pain is controlled, but occasional spasm in the L flank and bladder. Denies SOB/CP/N/V.     Vital Signs Last 24 Hrs  T(C): 36.8 (15 Aug 2023 19:45), Max: 37.6 (15 Aug 2023 17:35)  T(F): 98.2 (15 Aug 2023 19:45), Max: 99.7 (15 Aug 2023 17:35)  HR: 70 (15 Aug 2023 20:00) (48 - 78)  BP: 135/65 (15 Aug 2023 19:45) (117/59 - 175/78)  BP(mean): 93 (15 Aug 2023 19:45) (78 - 105)  RR: 16 (15 Aug 2023 20:00) (15 - 18)  SpO2: 92% (15 Aug 2023 20:00) (92% - 99%)    Parameters below as of 15 Aug 2023 20:00  Patient On (Oxygen Delivery Method): room air        I&O's Summary    14 Aug 2023 07:01  -  15 Aug 2023 07:00  --------------------------------------------------------  IN: 0 mL / OUT: 0 mL / NET: 0 mL    15 Aug 2023 07:01  -  15 Aug 2023 20:00  --------------------------------------------------------  IN: 50 mL / OUT: 100 mL / NET: -50 mL        Physical Exam  Gen: NAD, A&Ox3  Pulm: No respiratory distress, no subcostal retractions  Abd: Soft, NT, ND  Back: no cvat bl  : castaneda with pink urine                          13.0   7.15  )-----------( 169      ( 15 Aug 2023 10:57 )             41.0       08-15    139  |  110<H>  |  23  ----------------------------<  75  4.7   |  19<L>  |  1.64<H>    Ca    8.4      15 Aug 2023 10:57  Phos  2.3     08-14  Mg     2.2     08-14

## 2023-08-15 NOTE — PRE-ANESTHESIA EVALUATION ADULT - WEIGHT IN KG
Quality 226: Preventive Care And Screening: Tobacco Use: Screening And Cessation Intervention: Patient screened for tobacco use and is an ex/non-smoker
Detail Level: Detailed
Quality 431: Preventive Care And Screening: Unhealthy Alcohol Use - Screening: Patient not identified as an unhealthy alcohol user when screened for unhealthy alcohol use using a systematic screening method
Quality 130: Documentation Of Current Medications In The Medical Record: Current Medications Documented
79.4

## 2023-08-15 NOTE — PROGRESS NOTE ADULT - SUBJECTIVE AND OBJECTIVE BOX
Post op Check    Pt seen and examined complaining of bladder spasms and stent colic. Denies SOB/CP/N/V.     Vital Signs Last 24 Hrs  T(C): 36.8 (15 Aug 2023 19:45), Max: 37.6 (15 Aug 2023 17:35)  T(F): 98.2 (15 Aug 2023 19:45), Max: 99.7 (15 Aug 2023 17:35)  HR: 61 (15 Aug 2023 20:34) (55 - 78)  BP: 148/74 (15 Aug 2023 20:34) (117/59 - 175/78)  BP(mean): 103 (15 Aug 2023 20:34) (78 - 105)  RR: 16 (15 Aug 2023 20:34) (15 - 18)  SpO2: 95% (15 Aug 2023 20:34) (92% - 99%)    Parameters below as of 15 Aug 2023 20:34  Patient On (Oxygen Delivery Method): room air        I&O's Summary    14 Aug 2023 07:01  -  15 Aug 2023 07:00  --------------------------------------------------------  IN: 0 mL / OUT: 0 mL / NET: 0 mL    15 Aug 2023 07:01  -  15 Aug 2023 20:54  --------------------------------------------------------  IN: 170 mL / OUT: 320 mL / NET: -150 mL    I&O's Detail    14 Aug 2023 07:01  -  15 Aug 2023 07:00  --------------------------------------------------------  IN:  Total IN: 0 mL    OUT:    Oral Fluid: 0 mL  Total OUT: 0 mL    Total NET: 0 mL      15 Aug 2023 07:01  -  15 Aug 2023 20:54  --------------------------------------------------------  IN:    IV PiggyBack: 50 mL    Oral Fluid: 120 mL  Total IN: 170 mL    OUT:    Indwelling Catheter - Urethral (mL): 320 mL  Total OUT: 320 mL    Total NET: -150 mL          Physical Exam  Gen: awake alert NAD AXOX3  Pulm: No respiratory distress, no subcostal retractions  CV: RRR, no JVD  Abd: Soft, NT, ND  Back: mild L CVAT   : castaneda in place draining clear pink urine                           13.0   7.15  )-----------( 169      ( 15 Aug 2023 10:57 )             41.0       08-15    139  |  110<H>  |  23  ----------------------------<  75  4.7   |  19<L>  |  1.64<H>    Ca    8.4      15 Aug 2023 10:57  Phos  2.3     08-14  Mg     2.2     08-14

## 2023-08-15 NOTE — PROGRESS NOTE ADULT - ASSESSMENT
73 M, Occitan speaking, hx of HLD, HTN, gout, DVT s/p IVC filter, presenting with L flank pain, found to have 3mm obstructing stone in distal L ureter with mild L hydronephrosis. Pt admitted for pain control and management of HERLINDA 2/2 obstructing stone.

## 2023-08-16 LAB
ANION GAP SERPL CALC-SCNC: 15 MMOL/L — SIGNIFICANT CHANGE UP (ref 5–17)
BUN SERPL-MCNC: 18 MG/DL — SIGNIFICANT CHANGE UP (ref 7–23)
CALCIUM SERPL-MCNC: 8.8 MG/DL — SIGNIFICANT CHANGE UP (ref 8.4–10.5)
CHLORIDE SERPL-SCNC: 106 MMOL/L — SIGNIFICANT CHANGE UP (ref 96–108)
CO2 SERPL-SCNC: 19 MMOL/L — LOW (ref 22–31)
CREAT SERPL-MCNC: 1.06 MG/DL — SIGNIFICANT CHANGE UP (ref 0.5–1.3)
EGFR: 75 ML/MIN/1.73M2 — SIGNIFICANT CHANGE UP
GLUCOSE SERPL-MCNC: 124 MG/DL — HIGH (ref 70–99)
HCT VFR BLD CALC: 39.5 % — SIGNIFICANT CHANGE UP (ref 39–50)
HGB BLD-MCNC: 13 G/DL — SIGNIFICANT CHANGE UP (ref 13–17)
MCHC RBC-ENTMCNC: 30.1 PG — SIGNIFICANT CHANGE UP (ref 27–34)
MCHC RBC-ENTMCNC: 32.9 GM/DL — SIGNIFICANT CHANGE UP (ref 32–36)
MCV RBC AUTO: 91.4 FL — SIGNIFICANT CHANGE UP (ref 80–100)
MRSA PCR RESULT.: SIGNIFICANT CHANGE UP
NRBC # BLD: 0 /100 WBCS — SIGNIFICANT CHANGE UP (ref 0–0)
PLATELET # BLD AUTO: 171 K/UL — SIGNIFICANT CHANGE UP (ref 150–400)
POTASSIUM SERPL-MCNC: 4.5 MMOL/L — SIGNIFICANT CHANGE UP (ref 3.5–5.3)
POTASSIUM SERPL-SCNC: 4.5 MMOL/L — SIGNIFICANT CHANGE UP (ref 3.5–5.3)
RBC # BLD: 4.32 M/UL — SIGNIFICANT CHANGE UP (ref 4.2–5.8)
RBC # FLD: 11.8 % — SIGNIFICANT CHANGE UP (ref 10.3–14.5)
S AUREUS DNA NOSE QL NAA+PROBE: DETECTED
SODIUM SERPL-SCNC: 140 MMOL/L — SIGNIFICANT CHANGE UP (ref 135–145)
WBC # BLD: 6.78 K/UL — SIGNIFICANT CHANGE UP (ref 3.8–10.5)
WBC # FLD AUTO: 6.78 K/UL — SIGNIFICANT CHANGE UP (ref 3.8–10.5)

## 2023-08-16 PROCEDURE — 99222 1ST HOSP IP/OBS MODERATE 55: CPT

## 2023-08-16 PROCEDURE — 99233 SBSQ HOSP IP/OBS HIGH 50: CPT

## 2023-08-16 RX ORDER — CHLORHEXIDINE GLUCONATE 213 G/1000ML
1 SOLUTION TOPICAL
Refills: 0 | Status: DISCONTINUED | OUTPATIENT
Start: 2023-08-16 | End: 2023-08-18

## 2023-08-16 RX ADMIN — ATORVASTATIN CALCIUM 40 MILLIGRAM(S): 80 TABLET, FILM COATED ORAL at 11:14

## 2023-08-16 RX ADMIN — CEFTRIAXONE 100 MILLIGRAM(S): 500 INJECTION, POWDER, FOR SOLUTION INTRAMUSCULAR; INTRAVENOUS at 18:57

## 2023-08-16 RX ADMIN — HEPARIN SODIUM 5000 UNIT(S): 5000 INJECTION INTRAVENOUS; SUBCUTANEOUS at 21:12

## 2023-08-16 RX ADMIN — TAMSULOSIN HYDROCHLORIDE 0.4 MILLIGRAM(S): 0.4 CAPSULE ORAL at 21:14

## 2023-08-16 RX ADMIN — HEPARIN SODIUM 5000 UNIT(S): 5000 INJECTION INTRAVENOUS; SUBCUTANEOUS at 05:13

## 2023-08-16 RX ADMIN — Medication 10 MILLIGRAM(S): at 12:02

## 2023-08-16 NOTE — PROGRESS NOTE ADULT - ASSESSMENT
73 M, Vietnamese speaking, hx of HLD, HTN, gout, DVT s/p IVC filter, presenting with L flank pain, found to have 3mm obstructing stone in distal L ureter with mild L hydronephrosis. Pt admitted for pain control and management of HERLINDA 2/2 obstructing stone.

## 2023-08-16 NOTE — PROGRESS NOTE ADULT - SUBJECTIVE AND OBJECTIVE BOX
Subjective  Bradycardia overnight on telemetry. Feels some flank discomfort, likely from stent.    Objective    Vital signs  T(F): , Max: 99.7 (08-15-23 @ 17:35)  HR: 49 (08-16-23 @ 05:06)  BP: 148/74 (08-16-23 @ 05:06)  SpO2: 95% (08-16-23 @ 04:24)  Wt(kg): --    Output     OUT:    Indwelling Catheter - Urethral (mL): 1620 mL  Total OUT: 1620 mL    Total NET: -1620 mL          Gen: NAD  Abd: soft, nontender, nondistended  : castaneda secured in place, draining clear pink urine    Labs      08-15 @ 10:57    WBC 7.15  / Hct 41.0  / SCr 1.64     08-14 @ 13:47    WBC 9.33  / Hct 40.6  / SCr 1.58         Culture - Urine (collected 08-13-23 @ 19:23)  Source: Clean Catch Clean Catch (Midstream)  Final Report (08-14-23 @ 19:20):    <10,000 CFU/mL Normal Urogenital Elba

## 2023-08-16 NOTE — PROGRESS NOTE ADULT - SUBJECTIVE AND OBJECTIVE BOX
[facility] Division of Hospital Medicine  Blanca Ramos,   [employer]    Patient is a 73y old  Male who presents with a chief complaint of Nephrolithiasis, HERLINDA (16 Aug 2023 06:48)        SUBJECTIVE / OVERNIGHT EVENTS: no acute complaints       MEDICATIONS  (STANDING):  atorvastatin 40 milliGRAM(s) Oral daily  cefTRIAXone   IVPB 1000 milliGRAM(s) IV Intermittent every 24 hours  cefTRIAXone   IVPB      chlorhexidine 2% Cloths 1 Application(s) Topical <User Schedule>  diazepam    Tablet 2 milliGRAM(s) Oral once  enalapril 10 milliGRAM(s) Oral daily  heparin   Injectable 5000 Unit(s) SubCutaneous every 8 hours  tamsulosin 0.4 milliGRAM(s) Oral at bedtime    MEDICATIONS  (PRN):  acetaminophen     Tablet .. 975 milliGRAM(s) Oral every 6 hours PRN Mild Pain (1 - 3)  oxyCODONE    IR 5 milliGRAM(s) Oral every 4 hours PRN Severe Pain (7 - 10)      Vital Signs Last 24 Hrs  T(C): 36.8 (16 Aug 2023 12:07), Max: 37.6 (15 Aug 2023 17:35)  T(F): 98.3 (16 Aug 2023 12:07), Max: 99.7 (15 Aug 2023 17:35)  HR: 57 (16 Aug 2023 12:07) (49 - 78)  BP: 168/76 (16 Aug 2023 12:07) (117/59 - 175/78)  BP(mean): 103 (15 Aug 2023 20:34) (78 - 105)  RR: 18 (16 Aug 2023 12:07) (15 - 18)  SpO2: 95% (16 Aug 2023 12:07) (92% - 99%)    Parameters below as of 16 Aug 2023 12:07  Patient On (Oxygen Delivery Method): room air      CAPILLARY BLOOD GLUCOSE        I&O's Summary    15 Aug 2023 07:01  -  16 Aug 2023 07:00  --------------------------------------------------------  IN: 170 mL / OUT: 1620 mL / NET: -1450 mL    16 Aug 2023 07:01  -  16 Aug 2023 15:57  --------------------------------------------------------  IN: 500 mL / OUT: 0 mL / NET: 500 mL        PHYSICAL EXAM:  GENERAL: NAD, breathing normal  HEAD:  Atraumatic, Normocephalic  EYES: conjunctiva and sclera clear  NECK: supple, No JVD  CHEST/LUNG: CTA b/l  HEART: S1 S2 RRR  ABDOMEN: +BS Soft, NT/ND, bloody urine in castaneda bag.   EXTREMITIES:  2+ DP Pulses, No c/c. no LE edema  NEUROLOGY: AAOx3, no facial droop, moving all extremities   SKIN: No rashes or lesions    LABS:                        13.0   6.78  )-----------( 171      ( 16 Aug 2023 06:27 )             39.5     08-16    140  |  106  |  18  ----------------------------<  124<H>  4.5   |  19<L>  |  1.06    Ca    8.8      16 Aug 2023 06:28            Urinalysis Basic - ( 16 Aug 2023 06:28 )    Color: x / Appearance: x / SG: x / pH: x  Gluc: 124 mg/dL / Ketone: x  / Bili: x / Urobili: x   Blood: x / Protein: x / Nitrite: x   Leuk Esterase: x / RBC: x / WBC x   Sq Epi: x / Non Sq Epi: x / Bacteria: x        RADIOLOGY & ADDITIONAL TESTS:    Imaging Personally Reviewed:  Consultant(s) Notes Reviewed:    Care Discussed with Consultants/Other Providers:

## 2023-08-16 NOTE — CONSULT NOTE ADULT - SUBJECTIVE AND OBJECTIVE BOX
HPI:  Patient is a 73y Male past medical history of CAD, HTN, HLD presenting to the ED with 48 hour history of left flank pain. He describes the pain as sharp, was initially intermittent but now is constant. The pain has been associated with nausea and 7 episodes of emesis, last episode 3 am this morning. He denies fevers, chills, hematuria, dysuria, hx of kidney stones. States his pain is improved now. Last ate ~3am.  In the ED, AVSS. Labs showed WBC 10.76, Cr 1.60-->1.54. UA not concerning for infection.  CT abd pelvis showed 3mm L distal stone with mild hydro.    Interview performed with [employer]  [id number].    PAST MEDICAL & SURGICAL HISTORY:  HTN (hypertension)      Hyperlipemia      CAD (coronary artery disease)        FAMILY HISTORY:    SOCIAL HISTORY:   Tobacco hx:  MEDICATIONS  (STANDING):  tamsulosin 0.4 milliGRAM(s) Oral at bedtime    MEDICATIONS  (PRN):    Allergies    No Known Allergies    Intolerances        REVIEW OF SYSTEMS: Pertinent positives and negatives as stated in HPI, otherwise negative    Vital signs  T(C): 36.7 (08-13-23 @ 19:40), Max: 36.9 (08-13-23 @ 14:17)  HR: 50 (08-14-23 @ 02:32)  BP: 141/82 (08-14-23 @ 02:32)  SpO2: 99% (08-14-23 @ 02:32)  Wt(kg): --    Output      Physical Exam  Gen: NAD  Pulm: No respiratory distress, no subcostal retractions  Abd: Soft, NT, ND, no rebound/guarding  Back: (-) CVAT   MSK: No edema present    LABS:        08-13 @ 21:16    WBC --    / Hct --    / SCr 1.53     08-13 @ 16:33    WBC 10.76 / Hct 47.4  / SCr 1.60     08-13    141  |  109<H>  |  26<H>  ----------------------------<  108<H>  4.2   |  22  |  1.53<H>    Ca    8.9      13 Aug 2023 21:16  Mg     2.4     08-13    TPro  7.6  /  Alb  4.9  /  TBili  0.6  /  DBili  x   /  AST  23  /  ALT  29  /  AlkPhos  80  08-13      Urinalysis Basic - ( 13 Aug 2023 21:16 )    Color: x / Appearance: x / SG: x / pH: x  Gluc: 108 mg/dL / Ketone: x  / Bili: x / Urobili: x   Blood: x / Protein: x / Nitrite: x   Leuk Esterase: x / RBC: x / WBC x   Sq Epi: x / Non Sq Epi: x / Bacteria: x        Urine Cx: pending      Radiology:  ACC: [id number] EXAM:  CT ABDOMEN AND PELVIS   ORDERED BY: ANTONIO SAINI     PROCEDURE DATE:  08/13/2023          INTERPRETATION:  CLINICAL INFORMATION: Left flank pain. Concerns for   nephrolithiasis.    COMPARISON: Emergency Department point-of-care ultrasound of the   bilateral kidneys.    CONTRAST/COMPLICATIONS:  IV Contrast: NONE  Oral Contrast: NONE  Complications: None reported at time of study completion    PROCEDURE:  CT of the Abdomen and Pelvis was performed in the prone position.  Sagittal and coronal reformats were performed.    FINDINGS:  LOWER CHEST: Scattered areas of subsegmental atelectasis bilaterally.   Aortic valvular calcifications.    Evaluation abdominopelvic viscera is limited without the administration   of IV contrast.    LIVER: Steatosis.  BILE DUCTS: Normal caliber.  GALLBLADDER: Cholelithiasis.  SPLEEN: Within normal limits.  PANCREAS: Punctate calcification in the pancreatic head.  ADRENALS: Within normal limits.  KIDNEYS/URETERS: Mild left hydroureteronephrosis secondary to a 3 mm   calculus within the distal left ureter, just proximal to the   ureterovesicular junction. Mild left perinephric stranding. No additional   nephrolithiasis or right-sided hydronephrosis.    BLADDER: Within normal limits.  REPRODUCTIVE ORGANS: Prostate is enlarged.    BOWEL: No bowel obstruction. Appendix is normal.  PERITONEUM: No ascites.  VESSELS: Left common iliac stent extends to level of the IVC. Infrarenal   IVC filter. Mild atherosclerotic disease.  RETROPERITONEUM/LYMPH NODES: No lymphadenopathy.  ABDOMINAL WALL: Within normal limits.  BONES: Degenerative changes.    IMPRESSION:    3 mm obstructing distal left ureteral calculus with associated mild left   hydroureteronephrosis.    --- End of Report ---           JAKE NULL MD; Resident Radiologist  This document has been electronically signed.  LILA JUÁREZ MD; Attending Radiologist  This document has been electronically signed. Aug 14 2023 12:28AM    
Patient is a 73y old  Male who presents with a chief complaint of Nephrolithiasis, HERLINDA (16 Aug 2023 15:56)      HPI:  73 M, Mohawk speaking, hx of HLD, HTN, gout, DVT s/p IVC filter, presenting to ED with complaints of L flank pain for the past 2 days. Pain is sharp and constant, associated with nausea and several episodes of vomiting. Last episode of vomiting was yesterday AM. No blood in vomit. Denies fevers, chest pain, SOB, constipation, diarrhea. lightheadedness. Pt denies smoking or alcohol use. Pt has a history of gout and takes medications for it, but does not remember the names. Gout is localized to big toe on R foot where he has swelling and tenderness of that first joint. No previous instances of kidney stones. Pt says in 2015, he was hospitalized for "blood clots" and required "vein surgery". He is not currently on any blood thinners. Pain is now improved from prior, no longer nauseous.   In ED, received morphine, ketorolac, tylenol for pain control; zofran for nausea. CT A/P showed 3mm obstructing stone in distal ureter with associated L mild hydronephrosis.  (14 Aug 2023 03:53)  Above reviewed;   Hx obtained using # [id number].  Pt still with some lt sided flank pain, feeling better since they removed castaneda, + hematuria.   + Dysuria, no suprapubic pain,   + nausea still but no vomiting.         PAST MEDICAL & SURGICAL HISTORY:  HTN (hypertension)      Hyperlipemia      CAD (coronary artery disease)      Gout      S/P IVC filter          REVIEW OF SYSTEMS    General: No Fevers, no chills     Skin: No rash  	  Ophthalmologic: Denies any discharge, redness.  	  ENMT: No nasal congestion or throat pain.     Respiratory and Thorax: No cough, sputum. Denies shortness of breath.  	  Cardiovascular: No chest pain,     Gastrointestinal: No diarrhea.    Genitourinary: per HPI    Musculoskeletal: No joint swelling     Neurological: No new extremity weakness.    Psychiatric: No hallucinations	    Extremities: No swelling     Endocrine: No abnormal heat or cold intolerance     Allergic/Immunologic:	No hives        Social history:  Lives with family, no smoking.        FAMILY HISTORY:  No pertinent family history of nephrolithiasis in first degree relatives        Allergies  No Known Allergies        Antimicrobials:  cefTRIAXone   IVPB 1000 milliGRAM(s) IV Intermittent every 24 hours          Vital Signs Last 24 Hrs  T(C): 36.8 (16 Aug 2023 12:07), Max: 36.8 (15 Aug 2023 19:45)  T(F): 98.3 (16 Aug 2023 12:07), Max: 98.3 (16 Aug 2023 12:07)  HR: 64 (16 Aug 2023 16:46) (49 - 78)  BP: 154/75 (16 Aug 2023 16:46) (117/59 - 168/76)  BP(mean): 103 (15 Aug 2023 20:34) (82 - 105)  RR: 18 (16 Aug 2023 12:07) (15 - 18)  SpO2: 95% (16 Aug 2023 12:07) (92% - 99%)    Parameters below as of 16 Aug 2023 12:07  Patient On (Oxygen Delivery Method): room air        PHYSICAL EXAM: Patient in no acute distress.    Constitutional: Comfortable. Awake and alert    Eyes: No discharge or conjunctival injection    ENT: No thrush. No pharyngeal erythema.    Neck: Supple,     Respiratory:  + air entry bilaterally.    Cardiovascular: S1 S2 wnl,     Gastrointestinal: Soft BS(+) no tenderness, non distended.    Genitourinary: lt sided CVA tenderness     Extremities: No edema.    Vascular: peripheral pulses felt    Neurological: No new gross focal deficits.    Skin: No rash     Musculoskeletal: No joint swelling.    Psychiatric: Affect normal.                              13.0   6.78  )-----------( 171      ( 16 Aug 2023 06:27 )             39.5       08-16    140  |  106  |  18  ----------------------------<  124<H>  4.5   |  19<L>  |  1.06    Ca    8.8      16 Aug 2023 06:28      Urinalysis (08.14.23 @ 03:21)   pH Urine: 5.5  Glucose Qualitative, Urine: Negative  Blood, Urine: Large  Color: Yellow  Urine Appearance: Slightly Turbid  Bilirubin: Negative  Ketone - Urine: Negative  Specific Gravity: 1.022  Protein, Urine: Trace  Urobilinogen: Negative  Nitrite: Negative  Leukocyte Esterase Concentration: Small  Urine Microscopic-Add On (NC) (08.14.23 @ 03:21)   Squamous Epithelial Cells: 2 /hpf  Granular Cast: 2 /LPF  Red Blood Cell - Urine: 44 /hpf  White Blood Cell - Urine: 8 /HPF  Hyaline Casts: 6 /LPF  Bacteria: Negative    Culture - Urine (collected 13 Aug 2023 19:23)  Source: Clean Catch Clean Catch (Midstream)  Final Report (14 Aug 2023 19:20):    <10,000 CFU/mL Normal Urogenital Elba          Radiology: Imaging reviewed and visualized personally [ x]    < from: CT Abdomen and Pelvis No Cont (08.13.23 @ 23:38) >  IMPRESSION:    3 mm obstructing distal left ureteral calculus with associated mild left   hydroureteronephrosis.        < from: Xray Chest 1 View- PORTABLE-Urgent (08.13.23 @ 16:15) >  IMPRESSION:    Clear lungs.

## 2023-08-16 NOTE — PROGRESS NOTE ADULT - PROBLEM SELECTOR PLAN 1
CT findings show obstructing 3mm stone with mild hydronephrosis.  - Initial UA showing few calcium oxalate crystals; repeat UA showing large blood   - Urology consulted appreciated - s/p L ureteral stent   - c/w flomax - TOV today  - Pain control with tylenol, Oxycodone prn. Avoid NSAIDs, morphine for now given HERLINDA.

## 2023-08-16 NOTE — CONSULT NOTE ADULT - ASSESSMENT
73 M, Sinhala speaking, hx of HLD, HTN, gout, DVT s/p IVC filter, presenting to ED with complaints of L flank pain for the past 2 days.     Overall abnormal CT with obstructing uropathy, HERLINDA, s/p stent placement, purulence noted during procedure.   Initial U/A and urine cx negative.   repeat urine cx during procedure sent.     PLAN:   c/w ceftriaxone   follow up urine cx  trend cbc, no leucocytosis   trend Cr, improved.   urology on board     Plan discussed with Medicine Attending.     Robert Tamez  Please contact through [employer] Teams   If no response or past 5 pm/weekend call [phone].

## 2023-08-16 NOTE — PROGRESS NOTE ADULT - ASSESSMENT
73y Male past medical history of CAD, HTN, HLD presenting to the ED with 48 hour history of left flank pain, found to have 3mm L distal stone with mild hydro and HERLINDA s/p L ureteral stent placement 8/15.    - AM labs pending  - TOV this morning  - Follow up left kidney urine culture  - Continue CTX  - Trend Cr  - Continue flomax for stent-related discomfort  - Patient will need to follow up with urology as outpatient for ureteroscopy and stent removal    [facility]  [address] [place] [phone]

## 2023-08-17 ENCOUNTER — TRANSCRIPTION ENCOUNTER (OUTPATIENT)
Age: 73
End: 2023-08-17

## 2023-08-17 LAB
ANION GAP SERPL CALC-SCNC: 11 MMOL/L — SIGNIFICANT CHANGE UP (ref 5–17)
BUN SERPL-MCNC: 18 MG/DL — SIGNIFICANT CHANGE UP (ref 7–23)
CALCIUM SERPL-MCNC: 9.6 MG/DL — SIGNIFICANT CHANGE UP (ref 8.4–10.5)
CHLORIDE SERPL-SCNC: 107 MMOL/L — SIGNIFICANT CHANGE UP (ref 96–108)
CO2 SERPL-SCNC: 24 MMOL/L — SIGNIFICANT CHANGE UP (ref 22–31)
CREAT SERPL-MCNC: 1.16 MG/DL — SIGNIFICANT CHANGE UP (ref 0.5–1.3)
EGFR: 67 ML/MIN/1.73M2 — SIGNIFICANT CHANGE UP
GLUCOSE SERPL-MCNC: 96 MG/DL — SIGNIFICANT CHANGE UP (ref 70–99)
HCT VFR BLD CALC: 42.3 % — SIGNIFICANT CHANGE UP (ref 39–50)
HGB BLD-MCNC: 13.9 G/DL — SIGNIFICANT CHANGE UP (ref 13–17)
MCHC RBC-ENTMCNC: 30.1 PG — SIGNIFICANT CHANGE UP (ref 27–34)
MCHC RBC-ENTMCNC: 32.9 GM/DL — SIGNIFICANT CHANGE UP (ref 32–36)
MCV RBC AUTO: 91.6 FL — SIGNIFICANT CHANGE UP (ref 80–100)
MRSA PCR RESULT.: SIGNIFICANT CHANGE UP
NRBC # BLD: 0 /100 WBCS — SIGNIFICANT CHANGE UP (ref 0–0)
PLATELET # BLD AUTO: 211 K/UL — SIGNIFICANT CHANGE UP (ref 150–400)
POTASSIUM SERPL-MCNC: 4.2 MMOL/L — SIGNIFICANT CHANGE UP (ref 3.5–5.3)
POTASSIUM SERPL-SCNC: 4.2 MMOL/L — SIGNIFICANT CHANGE UP (ref 3.5–5.3)
RBC # BLD: 4.62 M/UL — SIGNIFICANT CHANGE UP (ref 4.2–5.8)
RBC # FLD: 12 % — SIGNIFICANT CHANGE UP (ref 10.3–14.5)
S AUREUS DNA NOSE QL NAA+PROBE: DETECTED
SODIUM SERPL-SCNC: 142 MMOL/L — SIGNIFICANT CHANGE UP (ref 135–145)
WBC # BLD: 6.4 K/UL — SIGNIFICANT CHANGE UP (ref 3.8–10.5)
WBC # FLD AUTO: 6.4 K/UL — SIGNIFICANT CHANGE UP (ref 3.8–10.5)

## 2023-08-17 PROCEDURE — 99232 SBSQ HOSP IP/OBS MODERATE 35: CPT

## 2023-08-17 RX ORDER — ASPIRIN/CALCIUM CARB/MAGNESIUM 324 MG
1 TABLET ORAL
Qty: 0 | Refills: 0 | DISCHARGE
Start: 2023-08-17

## 2023-08-17 RX ORDER — CEFUROXIME AXETIL 250 MG
1 TABLET ORAL
Qty: 4 | Refills: 0
Start: 2023-08-17 | End: 2023-08-18

## 2023-08-17 RX ORDER — ASPIRIN/CALCIUM CARB/MAGNESIUM 324 MG
81 TABLET ORAL DAILY
Refills: 0 | Status: DISCONTINUED | OUTPATIENT
Start: 2023-08-17 | End: 2023-08-18

## 2023-08-17 RX ORDER — TAMSULOSIN HYDROCHLORIDE 0.4 MG/1
1 CAPSULE ORAL
Qty: 30 | Refills: 0
Start: 2023-08-17 | End: 2023-09-15

## 2023-08-17 RX ORDER — PHENAZOPYRIDINE HCL 100 MG
100 TABLET ORAL EVERY 8 HOURS
Refills: 0 | Status: DISCONTINUED | OUTPATIENT
Start: 2023-08-17 | End: 2023-08-18

## 2023-08-17 RX ADMIN — HEPARIN SODIUM 5000 UNIT(S): 5000 INJECTION INTRAVENOUS; SUBCUTANEOUS at 13:17

## 2023-08-17 RX ADMIN — OXYCODONE HYDROCHLORIDE 5 MILLIGRAM(S): 5 TABLET ORAL at 18:28

## 2023-08-17 RX ADMIN — Medication 10 MILLIGRAM(S): at 18:49

## 2023-08-17 RX ADMIN — HEPARIN SODIUM 5000 UNIT(S): 5000 INJECTION INTRAVENOUS; SUBCUTANEOUS at 05:07

## 2023-08-17 RX ADMIN — ATORVASTATIN CALCIUM 40 MILLIGRAM(S): 80 TABLET, FILM COATED ORAL at 11:11

## 2023-08-17 RX ADMIN — CEFTRIAXONE 100 MILLIGRAM(S): 500 INJECTION, POWDER, FOR SOLUTION INTRAMUSCULAR; INTRAVENOUS at 18:06

## 2023-08-17 RX ADMIN — TAMSULOSIN HYDROCHLORIDE 0.4 MILLIGRAM(S): 0.4 CAPSULE ORAL at 21:19

## 2023-08-17 RX ADMIN — OXYCODONE HYDROCHLORIDE 5 MILLIGRAM(S): 5 TABLET ORAL at 17:38

## 2023-08-17 RX ADMIN — HEPARIN SODIUM 5000 UNIT(S): 5000 INJECTION INTRAVENOUS; SUBCUTANEOUS at 21:20

## 2023-08-17 RX ADMIN — Medication 100 MILLIGRAM(S): at 14:31

## 2023-08-17 RX ADMIN — CHLORHEXIDINE GLUCONATE 1 APPLICATION(S): 213 SOLUTION TOPICAL at 05:08

## 2023-08-17 RX ADMIN — Medication 100 MILLIGRAM(S): at 21:20

## 2023-08-17 RX ADMIN — Medication 10 MILLIGRAM(S): at 05:06

## 2023-08-17 RX ADMIN — Medication 81 MILLIGRAM(S): at 14:31

## 2023-08-17 NOTE — PROVIDER CONTACT NOTE (OTHER) - REASON
Hypertension. BP- 191/ 82
Pt with an episode of PAT for 4.7 sec with HR up to 140
pt. HR down to 37, non sustaining

## 2023-08-17 NOTE — PROGRESS NOTE ADULT - PROVIDER SPECIALTY LIST ADULT
Urology
Urology
Infectious Disease
Infectious Disease
Urology
Internal Medicine

## 2023-08-17 NOTE — PROVIDER CONTACT NOTE (OTHER) - ASSESSMENT
Patient a & O x4.  C/o of mild headache.  Denies any dizziness.
Pt alert and oriented x 4. VSS. Asymptomatic. Pain to left flank pain mgt with Oxycodone PRN.
Pt is A&O X4. denies chest pain/ SOB/ discomfort. VSS. pt. asleep when tele event occurred. pt. 40-50s on tele.

## 2023-08-17 NOTE — PROGRESS NOTE ADULT - SUBJECTIVE AND OBJECTIVE BOX
INFECTIOUS DISEASE FOLLOW UP NOTE:    Interval History/ROS: Patient is a 73y old  Male who presents with a chief complaint of Nephrolithiasis, HERLINDA (16 Aug 2023 17:37)      Overnight events:    REVIEW OF SYSTEMS:  CONSTITUTIONAL: No fever or chills  HEENT: No sore throat  RESPIRATORY: No cough, no shortness of breath  CARDIOVASCULAR: No chest pain or palpitations  GASTROINTESTINAL: No abdominal or epigastric pain  GENITOURINARY: No dysuria  NEUROLOGICAL: No headache/dizziness  MSK: No joint pain, erythema, or swelling; no back pain  SKIN: No itching, rashes  All other ROS negative except noted above    Prior hospital charts reviewed [Yes]  Primary team notes reviewed [Yes]  Other consultant notes reviewed [Yes]    Allergies:  No Known Allergies      ANTIMICROBIALS:   cefTRIAXone   IVPB    cefTRIAXone   IVPB 1000 every 24 hours      OTHER MEDS: MEDICATIONS  (STANDING):  acetaminophen     Tablet .. 975 every 6 hours PRN  atorvastatin 40 daily  diazepam    Tablet 2 once  enalapril 10 daily  heparin   Injectable 5000 every 8 hours  oxyCODONE    IR 5 every 4 hours PRN  tamsulosin 0.4 at bedtime      Vital Signs Last 24 Hrs  T(F): 98.6 (08-17-23 @ 08:04), Max: 99.7 (08-15-23 @ 17:35)    Vital Signs Last 24 Hrs  HR: 50 (08-17-23 @ 08:04) (50 - 64)  BP: 169/66 (08-17-23 @ 08:04) (146/80 - 177/76)  RR: 18 (08-17-23 @ 08:04)  SpO2: 96% (08-17-23 @ 08:04) (94% - 96%)  Wt(kg): --    EXAM:  Constitutional: Comfortable. Awake and alert, NAD    Eyes: No discharge or conjunctival injection    ENT: No thrush. No pharyngeal erythema.    Neck: Supple,     Respiratory:  + air entry bilaterally.    Cardiovascular: S1 S2 wnl,     Gastrointestinal: Soft BS(+) no tenderness, non distended.    Genitourinary: lt sided CVA tenderness     Extremities: No edema.    Vascular: peripheral pulses felt    Neurological: No new gross focal deficits.    Skin: No rash     Musculoskeletal: No joint swelling.    Psychiatric: Affect normal.      Labs:                        13.9   6.40  )-----------( 211      ( 17 Aug 2023 06:12 )             42.3     08-17    142  |  107  |  18  ----------------------------<  96  4.2   |  24  |  1.16    Ca    9.6      17 Aug 2023 06:12        WBC Trend:  WBC Count: 6.40 (08-17-23 @ 06:12)  WBC Count: 6.78 (08-16-23 @ 06:27)  WBC Count: 7.15 (08-15-23 @ 10:57)  WBC Count: 9.33 (08-14-23 @ 13:47)      Creatine Trend:  Creatinine: 1.16 (08-17)  Creatinine: 1.06 (08-16)  Creatinine: 1.64 (08-15)  Creatinine: 1.58 (08-14)      Liver Biochemical Testing Trend:  Alanine Aminotransferase (ALT/SGPT): 29 (08-13)  Aspartate Aminotransferase (AST/SGOT): 23 (08-13-23 @ 16:33)  Bilirubin Total: 0.6 (08-13)      Trend LDH      Urinalysis Basic - ( 17 Aug 2023 06:12 )    Color: x / Appearance: x / SG: x / pH: x  Gluc: 96 mg/dL / Ketone: x  / Bili: x / Urobili: x   Blood: x / Protein: x / Nitrite: x   Leuk Esterase: x / RBC: x / WBC x   Sq Epi: x / Non Sq Epi: x / Bacteria: x      MICROBIOLOGY:    MRSA PCR Result.: Katt (08-15-23 @ 16:23)    Culture - Urine (collected 13 Aug 2023 19:23)  Source: Clean Catch Clean Catch (Midstream)  Final Report:    <10,000 CFU/mL Normal Urogenital Elba    Troponin T, High Sensitivity Result: 13 (08-14)  Troponin T, High Sensitivity Result: 40 (08-13)        RADIOLOGY:  imaging below reviewed     from: CT Abdomen and Pelvis No Cont (08.13.23 @ 23:38) >  IMPRESSION:    3 mm obstructing distal left ureteral calculus with associated mild left   hydroureteronephrosis.        < from: Xray Chest 1 View- PORTABLE-Urgent (08.13.23 @ 16:15) >  IMPRESSION:    Clear lungs.

## 2023-08-17 NOTE — PROGRESS NOTE ADULT - REASON FOR ADMISSION
Nephrolithiasis, HERLINDA

## 2023-08-17 NOTE — PROGRESS NOTE ADULT - SUBJECTIVE AND OBJECTIVE BOX
73yPatient is a 73y old  Male who presents with a chief complaint of Nephrolithiasis, HERLINDA (17 Aug 2023 17:09)      Interval history:  Afebrile, some discomfort in lt flank area when he urinates.       Allergies:   No Known Allergies    Antimicrobials:     cefTRIAXone   IVPB 1000 milliGRAM(s) IV Intermittent every 24 hours      REVIEW OF SYSTEMS:  No chest pain   No SOB  No abdominal pain  No rash.       Vital Signs Last 24 Hrs  T(C): 36.8 (08-17-23 @ 16:00), Max: 37 (08-17-23 @ 08:04)  T(F): 98.3 (08-17-23 @ 16:00), Max: 98.6 (08-17-23 @ 08:04)  HR: 68 (08-17-23 @ 17:30) (50 - 68)  BP: 191/82 (08-17-23 @ 17:30) (146/80 - 191/82)  BP(mean): --  RR: 18 (08-17-23 @ 16:00) (18 - 18)  SpO2: 95% (08-17-23 @ 16:00) (94% - 96%)      PHYSICAL EXAM:  Pt in no acute distress, alert, awake.   breathing comfortably.   non distended abdomen  no edema LE   no phlebitis                             13.9   6.40  )-----------( 211      ( 17 Aug 2023 06:12 )             42.3   08-17    142  |  107  |  18  ----------------------------<  96  4.2   |  24  |  1.16    Ca    9.6      17 Aug 2023 06:12        Culture - Urine (collected 15 Aug 2023 20:39)  Source: Kidney Kidney  Preliminary Report (17 Aug 2023 09:20):    No growth to date

## 2023-08-17 NOTE — PROVIDER CONTACT NOTE (OTHER) - ACTION/TREATMENT ORDERED:
PA ordered to give pain medication now and to repeat BP in 1 hr. Will continue to monitor the patient.
Continue current med management and cardiac monitor. Safety maintained.
SUE Dominguez aware. Ordered to monitor

## 2023-08-17 NOTE — PROGRESS NOTE ADULT - PROBLEM SELECTOR PLAN 5
takes Enalapril at home; BP stable   - currently holding ACE-I given HERLINDA   - restart when able takes Enalapril at home - restarted as HERLINDA resolved

## 2023-08-17 NOTE — PROGRESS NOTE ADULT - ASSESSMENT
73 M, Slovak speaking, hx of HLD, HTN, gout, DVT s/p IVC filter, presenting with L flank pain, found to have 3mm obstructing stone in distal L ureter with mild L hydronephrosis. Pt admitted for pain control and management of HERLINDA 2/2 obstructing stone.

## 2023-08-17 NOTE — PROVIDER CONTACT NOTE (OTHER) - RECOMMENDATIONS
Will give pain medication now. and will repeat BP check.
SUE Dominguez aware. Ordered to monitor
MD at the bedside and aware. SUE Lee notified

## 2023-08-17 NOTE — DISCHARGE NOTE PROVIDER - NSDCFUADDAPPT_GEN_ALL_CORE_FT
APPTS ARE READY TO BE MADE: [ X] YES    Best Family or Patient Contact (if needed):    Additional Information about above appointments (if needed):    1: PCP Dr. Carmelo Benavidez  within 1-2 weeks [phone]  2: - Patient will need to follow up with urology as outpatient for ureteroscopy and stent removal  Follow up at :   [facility]  [address] [place] [address]  [phone]  3:     Other comments or requests:    APPTS ARE READY TO BE MADE: [ X] YES    Best Family or Patient Contact (if needed):    Additional Information about above appointments (if needed):    1: PCP Dr. Carmelo Benavidez  within 1-2 weeks [phone]  2: - Patient will need to follow up with urology as outpatient for ureteroscopy and stent removal  Follow up at :   [facility]  [address] [place] [address]  [phone]  3:     Other comments or requests:   Patient was previously scheduled for an appointment on 08/19/2023 at [address] [place] [address] with Dr. Carmelo Benavidez.      APPTS ARE READY TO BE MADE: [ X] YES    Best Family or Patient Contact (if needed):    Additional Information about above appointments (if needed):    1: PCP Dr. Carmelo Benavidez  within 1-2 weeks [phone]  2: - Patient will need to follow up with urology as outpatient for ureteroscopy and stent removal  Follow up at :   [facility]  [address] [place] [address]  [phone]  3:     Other comments or requests:   Patient was previously scheduled for an appointment on 08/19/2023 at [address] [place] [address] with Dr. Carmelo Benavidez.     Patient was scheduled for an appointment on 09/29/2023 1:50PM with Dr. Anusha Cat. Patient/Caregiver was advised of appointment details.       APPTS ARE READY TO BE MADE: [ X] YES    Best Family or Patient Contact (if needed):    Additional Information about above appointments (if needed):    1: PCP Dr. Carmelo Benavidez  within 1-2 weeks [phone]  2: - Patient will need to follow up with urology as outpatient for ureteroscopy and stent removal  Follow up at :   [facility]  [address] [place] [address]  [phone]  3:     Other comments or requests:   Patient was previously scheduled for an appointment on 08/19/2023 at [address] [place] [address] with Dr. Carmelo Benavidez.     Patient was scheduled for an appointment on 09/29/2023 1:50PM at [address] [place] [id number] with Dr. Anusha Cat. Patient/Caregiver was advised of appointment details.

## 2023-08-17 NOTE — PROGRESS NOTE ADULT - PROBLEM SELECTOR PLAN 4
HR 50-80s - continue to monitor on telemetry

## 2023-08-17 NOTE — PROGRESS NOTE ADULT - SUBJECTIVE AND OBJECTIVE BOX
Subjective  No acute events overnight. Reports mild flank discomfort with urination, but improved from yesterday.    Objective    Vital signs  T(F): , Max: 98.6 (08-17-23 @ 08:04)  HR: 50 (08-17-23 @ 12:21)  BP: 175/71 (08-17-23 @ 12:21)  SpO2: 96% (08-17-23 @ 12:21)  Wt(kg): --    Output     OUT:    Indwelling Catheter - Urethral (mL): 600 mL    Voided (mL): 1900 mL  Total OUT: 2500 mL    Total NET: -2500 mL          Gen: NAD  Abd: soft, nontender, nondistended  : Voiding spontaneously    Labs      08-17 @ 06:12    WBC 6.40  / Hct 42.3  / SCr 1.16     08-16 @ 06:28    WBC --    / Hct --    / SCr 1.06         Culture - Urine (collected 08-15-23 @ 20:39)  Source: Kidney Kidney  Preliminary Report (08-17-23 @ 09:20):    No growth to date    Culture - Urine (collected 08-13-23 @ 19:23)  Source: Clean Catch Clean Catch (Midstream)  Final Report (08-14-23 @ 19:20):    <10,000 CFU/mL Normal Urogenital Elba

## 2023-08-17 NOTE — PROGRESS NOTE ADULT - PROBLEM SELECTOR PLAN 2
resolved  likely due to obstructing stone vs hypovolemia from multiple episodes of vomiting prior.   - Avoid nephrotoxic meds, dose medications renally  - Monitor I/Os
resolved  likely due to obstructing stone vs hypovolemia from multiple episodes of vomiting prior.   - Avoid nephrotoxic meds, dose medications renally  - Monitor I/Os
SCr 1.5-1.6 - unclear baseline  likely due to obstructing stone vs hypovolemia from multiple episodes of vomiting prior.   - Trend BMP, SCr  - Avoid nephrotoxic meds, dose medications renally  - Monitor I/Os

## 2023-08-17 NOTE — PROGRESS NOTE ADULT - PROBLEM SELECTOR PLAN 6
DVT ppx: heparin subq held for procedure - but still with hematuria - monitor h/h
DVT ppx: heparin subq held for procedure - but still with hematuria - monitor h/h
DVT ppx: heparin subq held for procedure - will restart post procedure if cleared by urology

## 2023-08-17 NOTE — PROGRESS NOTE ADULT - SUBJECTIVE AND OBJECTIVE BOX
[facility] Division of Hospital Medicine  Blanca Ramos,   [employer]    Patient is a 73y old  Male who presents with a chief complaint of Nephrolithiasis, HERLINDA (17 Aug 2023 09:11)        SUBJECTIVE / OVERNIGHT EVENTS:       MEDICATIONS  (STANDING):  atorvastatin 40 milliGRAM(s) Oral daily  cefTRIAXone   IVPB      cefTRIAXone   IVPB 1000 milliGRAM(s) IV Intermittent every 24 hours  chlorhexidine 2% Cloths 1 Application(s) Topical <User Schedule>  diazepam    Tablet 2 milliGRAM(s) Oral once  enalapril 10 milliGRAM(s) Oral daily  heparin   Injectable 5000 Unit(s) SubCutaneous every 8 hours  tamsulosin 0.4 milliGRAM(s) Oral at bedtime    MEDICATIONS  (PRN):  acetaminophen     Tablet .. 975 milliGRAM(s) Oral every 6 hours PRN Mild Pain (1 - 3)  oxyCODONE    IR 5 milliGRAM(s) Oral every 4 hours PRN Severe Pain (7 - 10)      Vital Signs Last 24 Hrs  T(C): 36.7 (17 Aug 2023 12:21), Max: 37 (17 Aug 2023 08:04)  T(F): 98 (17 Aug 2023 12:21), Max: 98.6 (17 Aug 2023 08:04)  HR: 50 (17 Aug 2023 12:21) (50 - 64)  BP: 175/71 (17 Aug 2023 12:21) (146/80 - 177/76)  BP(mean): --  RR: 18 (17 Aug 2023 12:21) (18 - 18)  SpO2: 96% (17 Aug 2023 12:21) (94% - 96%)    Parameters below as of 17 Aug 2023 12:21  Patient On (Oxygen Delivery Method): room air      CAPILLARY BLOOD GLUCOSE        I&O's Summary    16 Aug 2023 07:01  -  17 Aug 2023 07:00  --------------------------------------------------------  IN: 860 mL / OUT: 2500 mL / NET: -1640 mL    17 Aug 2023 07:01  -  17 Aug 2023 13:19  --------------------------------------------------------  IN: 560 mL / OUT: 0 mL / NET: 560 mL        PHYSICAL EXAM:  GENERAL: NAD, breathing normal  HEAD:  Atraumatic, Normocephalic  EYES: conjunctiva and sclera clear  NECK: supple, No JVD  CHEST/LUNG: CTA b/l  HEART: S1 S2 RRR  ABDOMEN: +BS Soft, NT/ND, urine dark but improved   EXTREMITIES:  2+ DP Pulses, No c/c. no LE edema  NEUROLOGY: AAOx3, no facial droop, moving all extremities   SKIN: No rashes or lesions    LABS:                        13.9   6.40  )-----------( 211      ( 17 Aug 2023 06:12 )             42.3     08-17    142  |  107  |  18  ----------------------------<  96  4.2   |  24  |  1.16    Ca    9.6      17 Aug 2023 06:12            Urinalysis Basic - ( 17 Aug 2023 06:12 )    Color: x / Appearance: x / SG: x / pH: x  Gluc: 96 mg/dL / Ketone: x  / Bili: x / Urobili: x   Blood: x / Protein: x / Nitrite: x   Leuk Esterase: x / RBC: x / WBC x   Sq Epi: x / Non Sq Epi: x / Bacteria: x        RADIOLOGY & ADDITIONAL TESTS:    Imaging Personally Reviewed:  Consultant(s) Notes Reviewed:    Care Discussed with Consultants/Other Providers:   [facility] Division of Hospital Medicine  Blanca Ramos,   [employer]    Patient is a 73y old  Male who presents with a chief complaint of Nephrolithiasis, HERLINDA (17 Aug 2023 09:11)        SUBJECTIVE / OVERNIGHT EVENTS: still having dysuria but overall improved       MEDICATIONS  (STANDING):  atorvastatin 40 milliGRAM(s) Oral daily  cefTRIAXone   IVPB      cefTRIAXone   IVPB 1000 milliGRAM(s) IV Intermittent every 24 hours  chlorhexidine 2% Cloths 1 Application(s) Topical <User Schedule>  diazepam    Tablet 2 milliGRAM(s) Oral once  enalapril 10 milliGRAM(s) Oral daily  heparin   Injectable 5000 Unit(s) SubCutaneous every 8 hours  tamsulosin 0.4 milliGRAM(s) Oral at bedtime    MEDICATIONS  (PRN):  acetaminophen     Tablet .. 975 milliGRAM(s) Oral every 6 hours PRN Mild Pain (1 - 3)  oxyCODONE    IR 5 milliGRAM(s) Oral every 4 hours PRN Severe Pain (7 - 10)      Vital Signs Last 24 Hrs  T(C): 36.7 (17 Aug 2023 12:21), Max: 37 (17 Aug 2023 08:04)  T(F): 98 (17 Aug 2023 12:21), Max: 98.6 (17 Aug 2023 08:04)  HR: 50 (17 Aug 2023 12:21) (50 - 64)  BP: 175/71 (17 Aug 2023 12:21) (146/80 - 177/76)  BP(mean): --  RR: 18 (17 Aug 2023 12:21) (18 - 18)  SpO2: 96% (17 Aug 2023 12:21) (94% - 96%)    Parameters below as of 17 Aug 2023 12:21  Patient On (Oxygen Delivery Method): room air      CAPILLARY BLOOD GLUCOSE        I&O's Summary    16 Aug 2023 07:01  -  17 Aug 2023 07:00  --------------------------------------------------------  IN: 860 mL / OUT: 2500 mL / NET: -1640 mL    17 Aug 2023 07:01  -  17 Aug 2023 13:19  --------------------------------------------------------  IN: 560 mL / OUT: 0 mL / NET: 560 mL        PHYSICAL EXAM:  GENERAL: NAD, breathing normal  HEAD:  Atraumatic, Normocephalic  EYES: conjunctiva and sclera clear  NECK: supple, No JVD  CHEST/LUNG: CTA b/l  HEART: S1 S2 RRR  ABDOMEN: +BS Soft, NT/ND, urine dark but improved   EXTREMITIES:  2+ DP Pulses, No c/c. no LE edema  NEUROLOGY: AAOx3, no facial droop, moving all extremities   SKIN: No rashes or lesions    LABS:                        13.9   6.40  )-----------( 211      ( 17 Aug 2023 06:12 )             42.3     08-17    142  |  107  |  18  ----------------------------<  96  4.2   |  24  |  1.16    Ca    9.6      17 Aug 2023 06:12            Urinalysis Basic - ( 17 Aug 2023 06:12 )    Color: x / Appearance: x / SG: x / pH: x  Gluc: 96 mg/dL / Ketone: x  / Bili: x / Urobili: x   Blood: x / Protein: x / Nitrite: x   Leuk Esterase: x / RBC: x / WBC x   Sq Epi: x / Non Sq Epi: x / Bacteria: x        RADIOLOGY & ADDITIONAL TESTS:    Imaging Personally Reviewed:  Consultant(s) Notes Reviewed:    Care Discussed with Consultants/Other Providers:

## 2023-08-17 NOTE — PROGRESS NOTE ADULT - ASSESSMENT
73 M, Hebrew speaking, hx of HLD, HTN, gout, DVT s/p IVC filter, presenting to ED with complaints of L flank pain for the past 2 days.     Overall abnormal CT with obstructing uropathy, HERLINDA, s/p stent placement, purulence noted during procedure.   Initial U/A and urine cx negative.   repeat urine cx during procedure sent.     PLAN:   c/w ceftriaxone   follow up urine cx  trend cbc, no leucocytosis   trend Cr, improved.   urology on board

## 2023-08-17 NOTE — PROVIDER CONTACT NOTE (OTHER) - SITUATION
Pt with an episode of PAT for 4.7 sec with HR up to 140
Hypertension. BP- 191/ 82.
pt. HR down to 37, non sustaining.

## 2023-08-17 NOTE — DISCHARGE NOTE PROVIDER - CARE PROVIDER_API CALL
Carmelo Benavidez  Internal Medicine  [address] [facility] [address]  [place] [address]  Phone: [phone]  Fax: [phone]  Follow Up Time: 1 week

## 2023-08-17 NOTE — DISCHARGE NOTE PROVIDER - NSDCMRMEDTOKEN_GEN_ALL_CORE_FT
aspirin 81 mg oral tablet, chewable: 1 tab(s) orally once a day  atorvastatin 40 mg oral tablet: 1 tab(s) orally once a day  cefuroxime 500 mg oral tablet: 1 tab(s) orally 2 times a day to start the evening of 8/18/23  enalapril 10 mg oral tablet: 1 tab(s) orally once a day  febuxostat 80 mg oral tablet: 1 tab(s) orally once a day  fenofibrate 160 mg oral tablet: 1 tab(s) orally once a day  icosapent 1 g oral capsule: 2 cap(s) orally once a day  tamsulosin 0.4 mg oral capsule: 1 cap(s) orally once a day (at bedtime)   aspirin 81 mg oral tablet, chewable: 1 tab(s) orally once a day  atorvastatin 40 mg oral tablet: 1 tab(s) orally once a day  cefuroxime 500 mg oral tablet: 1 tab(s) orally 2 times a day to start the evening of 8/18/23  enalapril 20 mg oral tablet: 1 tab(s) orally once a day  febuxostat 80 mg oral tablet: 1 tab(s) orally once a day  fenofibrate 160 mg oral tablet: 1 tab(s) orally once a day  icosapent 1 g oral capsule: 2 cap(s) orally once a day  tamsulosin 0.4 mg oral capsule: 1 cap(s) orally once a day (at bedtime)

## 2023-08-17 NOTE — PROGRESS NOTE ADULT - ASSESSMENT
73y Male past medical history of CAD, HTN, HLD presenting to the ED with 48 hour history of left flank pain, found to have 3mm L distal stone with mild hydro and HERLINDA s/p L ureteral stent placement 8/15.    - Labs reviewed - Cr 1.16  - Left kidney culture NGTD  - From urology perspective, patient can be discharged on empiric antibiotics  - Continue Flomax as outpatient for stent-related discomfort  - Patient will need to follow up with urology as outpatient for ureteroscopy and stent removal    [facility]  [address] [place] [id number] [phone]

## 2023-08-17 NOTE — PROGRESS NOTE ADULT - ASSESSMENT
73 M, Bulgarian speaking, hx of HLD, HTN, gout, DVT s/p IVC filter, presenting to ED with complaints of L flank pain for the past 2 days.     Overall abnormal CT with obstructing uropathy, HERLINDA, s/p stent placement, purulence noted during procedure.   Initial U/A and urine cx negative.   repeat urine cx during procedure sent.     PLAN:   c/w ceftriaxone while inpt   urine cx, negative   trend cbc, no leucocytosis   trend Cr, improved.   urology on board   can transition to po cefpodoxime 200 mg q12h to complete 5 days total.     Plan discussed with Medicine Attending.     Robert Tamez  Please contact through MS Teams   If no response or past 5 pm/weekend call [phone].     Will sign off, please call with questions.

## 2023-08-17 NOTE — DISCHARGE NOTE PROVIDER - NSDCCPCAREPLAN_GEN_ALL_CORE_FT
PRINCIPAL DISCHARGE DIAGNOSIS  Diagnosis: Kidney stone  Assessment and Plan of Treatment: You were admitted for left kidney stone on CT   You had a stent placed and were on empiric IV antibiotics  Continue your oral antibiotics for 2 days starting the evening of 8/18/23   Follow up with PCP and urology      SECONDARY DISCHARGE DIAGNOSES  Diagnosis: HERLINDA (acute kidney injury)  Assessment and Plan of Treatment: Avoid taking NSAIDs (ex: Ibuprofen, Advil, Celebrex, Naprosyn) and other agents that can harm the kidneys such as intravenous contrast for diagnostic testing, combination cold medications, etc. until you are instructed to do so by your Primary Care Physician.  Have all of your medications adjusted for your renal function by your Health Care Provider.  Blood pressure control is important.  Take all medication as prescribed.  Do not overconsume foods that are high in potassium, such as bananas, until you are instructed to do so by your primary care physician.      Diagnosis: Bradycardia  Assessment and Plan of Treatment: Monitored on tele- no events  Follow up with PCP   Not on beta blockers

## 2023-08-17 NOTE — PROGRESS NOTE ADULT - PROBLEM SELECTOR PLAN 1
CT findings show obstructing 3mm stone with mild hydronephrosis.  - Initial UA showing few calcium oxalate crystals; repeat UA showing large blood   - Urology consulted appreciated - s/p L ureteral stent   - c/w flomax - TOV today  - Pain control with tylenol, Oxycodone prn. Avoid NSAIDs, morphine for now given HERLINDA. CT findings show obstructing 3mm stone with mild hydronephrosis.  - Initial UA showing few calcium oxalate crystals; repeat UA showing large blood   - Urology consulted appreciated - s/p L ureteral stent   - c/w flomax, castaneda removed   - Pain control with tylenol, Oxycodone prn. Avoid NSAIDs, morphine for now given HERLINDA. CT findings show obstructing 3mm stone with mild hydronephrosis.  - Initial UA showing few calcium oxalate crystals; repeat UA showing large blood   - Urology consulted appreciated - s/p L ureteral stent   - c/w flomax, castaneda removed   - Pain control with tylenol, Oxycodone prn. Avoid NSAIDs, morphine for now given HERLINDA.  UTI - c/w ceftriaxone f/u UCx

## 2023-08-17 NOTE — DISCHARGE NOTE PROVIDER - HOSPITAL COURSE
73 M, Uzbek speaking, hx of HLD, HTN, gout, DVT s/p IVC filter, presenting with L flank pain, found to have 3mm obstructing stone in distal L ureter with mild L hydronephrosis. Pt admitted for pain control and management of HERLINDA 2/2 obstructing stone.     Left nephrolithiasis. CT findings show obstructing 3mm stone with mild hydronephrosis.  - Initial UA showing few calcium oxalate crystals; repeat UA showing large blood, d/w ID - DC on cefuroxime 500 BID for 2 days starting evening of 8/18  - Urology consulted appreciated - s/p L ureteral stent - follow up outpatient   - c/w flomax, castaneda removed   - Pain control with tylenol, Oxycodone prn. Avoid NSAIDs, morphine for now given HERLINDA.     HERLINDA (acute kidney injury).  resolved  likely due to obstructing stone vs hypovolemia from multiple episodes of vomiting prior.   - Avoid nephrotoxic meds, dose medications renally    CAD (coronary artery disease).   will restart asa 81mg qd if h/h stable post procedure.  suspect he had previous stent in 2015  no active cardiac symptoms.    Bradycardia. HR 50-80s -no events on tele    HTN (hypertension).    takes Enalapril at home - restarted as HERLINDA resolved.    Patient medically cleared as per Dr. Beasley on 8/17/23 with outpatient PCP and urology follow up. Med rec reviewed with attending, hemodynamically stable for dc today. 73 M, Upper sorbian speaking, hx of HLD, HTN, gout, DVT s/p IVC filter, presenting with L flank pain, found to have 3mm obstructing stone in distal L ureter with mild L hydronephrosis. Pt admitted for pain control and management of HERLINDA 2/2 obstructing stone.     Left nephrolithiasis. CT findings show obstructing 3mm stone with mild hydronephrosis.  - Initial UA showing few calcium oxalate crystals; repeat UA showing large blood, d/w ID - DC on cefuroxime 500 BID for 2 days starting evening of 8/18  - Urology consulted appreciated - s/p L ureteral stent - follow up outpatient   - c/w flomax, castaneda removed      HERLINDA (acute kidney injury).  resolved  likely due to obstructing stone vs hypovolemia from multiple episodes of vomiting prior.     CAD (coronary artery disease).   will restart asa 81mg qd if h/h stable post procedure.  suspect he had previous stent in 2015  no active cardiac symptoms.    Bradycardia. HR 50-80s -no events on tele    HTN (hypertension).    takes Enalapril at home - restarted as HERLINDA resolved, uptitrated as patient remained hypertensive.     Patient medically cleared as per Dr. Beasley on 8/17/23 with outpatient PCP and urology follow up. Med rec reviewed with attending, hemodynamically stable for dc today.

## 2023-08-17 NOTE — PROVIDER CONTACT NOTE (OTHER) - BACKGROUND
hx of CAD, HTN, HLD presenting to the ED with 48 hour history of sharp left flank pain. Admits N/V.
Admitted for calculus of kidney
Pt admitted with Kidney stones: IVF, Flomax, Tylenol, NPO for possible Ureteral stent today          HERLINDA         Bradycardia: tele

## 2023-08-18 ENCOUNTER — TRANSCRIPTION ENCOUNTER (OUTPATIENT)
Age: 73
End: 2023-08-18

## 2023-08-18 VITALS
DIASTOLIC BLOOD PRESSURE: 74 MMHG | HEART RATE: 54 BPM | TEMPERATURE: 98 F | RESPIRATION RATE: 18 BRPM | SYSTOLIC BLOOD PRESSURE: 153 MMHG | OXYGEN SATURATION: 96 %

## 2023-08-18 LAB
CULTURE RESULTS: NO GROWTH — SIGNIFICANT CHANGE UP
SPECIMEN SOURCE: SIGNIFICANT CHANGE UP

## 2023-08-18 PROCEDURE — 84300 ASSAY OF URINE SODIUM: CPT

## 2023-08-18 PROCEDURE — C9399: CPT

## 2023-08-18 PROCEDURE — 99285 EMERGENCY DEPT VISIT HI MDM: CPT

## 2023-08-18 PROCEDURE — 84156 ASSAY OF PROTEIN URINE: CPT

## 2023-08-18 PROCEDURE — C2617: CPT

## 2023-08-18 PROCEDURE — 80048 BASIC METABOLIC PNL TOTAL CA: CPT

## 2023-08-18 PROCEDURE — 80053 COMPREHEN METABOLIC PANEL: CPT

## 2023-08-18 PROCEDURE — 82570 ASSAY OF URINE CREATININE: CPT

## 2023-08-18 PROCEDURE — 84443 ASSAY THYROID STIM HORMONE: CPT

## 2023-08-18 PROCEDURE — 82947 ASSAY GLUCOSE BLOOD QUANT: CPT

## 2023-08-18 PROCEDURE — 87086 URINE CULTURE/COLONY COUNT: CPT

## 2023-08-18 PROCEDURE — 83690 ASSAY OF LIPASE: CPT

## 2023-08-18 PROCEDURE — 83935 ASSAY OF URINE OSMOLALITY: CPT

## 2023-08-18 PROCEDURE — 82330 ASSAY OF CALCIUM: CPT

## 2023-08-18 PROCEDURE — 87641 MR-STAPH DNA AMP PROBE: CPT

## 2023-08-18 PROCEDURE — 76000 FLUOROSCOPY <1 HR PHYS/QHP: CPT

## 2023-08-18 PROCEDURE — 99239 HOSP IP/OBS DSCHRG MGMT >30: CPT

## 2023-08-18 PROCEDURE — 36415 COLL VENOUS BLD VENIPUNCTURE: CPT

## 2023-08-18 PROCEDURE — 82803 BLOOD GASES ANY COMBINATION: CPT

## 2023-08-18 PROCEDURE — 81001 URINALYSIS AUTO W/SCOPE: CPT

## 2023-08-18 PROCEDURE — 85018 HEMOGLOBIN: CPT

## 2023-08-18 PROCEDURE — 84295 ASSAY OF SERUM SODIUM: CPT

## 2023-08-18 PROCEDURE — 83735 ASSAY OF MAGNESIUM: CPT

## 2023-08-18 PROCEDURE — 85014 HEMATOCRIT: CPT

## 2023-08-18 PROCEDURE — 86803 HEPATITIS C AB TEST: CPT

## 2023-08-18 PROCEDURE — 84540 ASSAY OF URINE/UREA-N: CPT

## 2023-08-18 PROCEDURE — 85027 COMPLETE CBC AUTOMATED: CPT

## 2023-08-18 PROCEDURE — 84484 ASSAY OF TROPONIN QUANT: CPT

## 2023-08-18 PROCEDURE — 84133 ASSAY OF URINE POTASSIUM: CPT

## 2023-08-18 PROCEDURE — 71045 X-RAY EXAM CHEST 1 VIEW: CPT

## 2023-08-18 PROCEDURE — 74176 CT ABD & PELVIS W/O CONTRAST: CPT | Mod: MA

## 2023-08-18 PROCEDURE — C1758: CPT

## 2023-08-18 PROCEDURE — 82435 ASSAY OF BLOOD CHLORIDE: CPT

## 2023-08-18 PROCEDURE — 84132 ASSAY OF SERUM POTASSIUM: CPT

## 2023-08-18 PROCEDURE — 84100 ASSAY OF PHOSPHORUS: CPT

## 2023-08-18 PROCEDURE — C1769: CPT

## 2023-08-18 PROCEDURE — 76775 US EXAM ABDO BACK WALL LIM: CPT

## 2023-08-18 PROCEDURE — 83605 ASSAY OF LACTIC ACID: CPT

## 2023-08-18 PROCEDURE — 87640 STAPH A DNA AMP PROBE: CPT

## 2023-08-18 PROCEDURE — 85025 COMPLETE CBC W/AUTO DIFF WBC: CPT

## 2023-08-18 RX ADMIN — Medication 100 MILLIGRAM(S): at 05:28

## 2023-08-18 RX ADMIN — CHLORHEXIDINE GLUCONATE 1 APPLICATION(S): 213 SOLUTION TOPICAL at 05:30

## 2023-08-18 RX ADMIN — Medication 20 MILLIGRAM(S): at 05:27

## 2023-08-18 RX ADMIN — ATORVASTATIN CALCIUM 40 MILLIGRAM(S): 80 TABLET, FILM COATED ORAL at 10:44

## 2023-08-18 RX ADMIN — HEPARIN SODIUM 5000 UNIT(S): 5000 INJECTION INTRAVENOUS; SUBCUTANEOUS at 05:30

## 2023-08-18 RX ADMIN — Medication 81 MILLIGRAM(S): at 10:43

## 2023-08-18 NOTE — DISCHARGE NOTE NURSING/CASE MANAGEMENT/SOCIAL WORK - PATIENT PORTAL LINK FT
You can access the FollowMyHealth Patient Portal offered by [facility] by registering at the following website: [email or web address]. By joining [employer]’s FollowMyHealth portal, you will also be able to view your health information using other applications (apps) compatible with our system.

## 2023-08-18 NOTE — DISCHARGE NOTE NURSING/CASE MANAGEMENT/SOCIAL WORK - NSDCPEFALRISK_GEN_ALL_CORE
For information on Fall & Injury Prevention, visit: [email or web address] OR  [email or web address] OR  https://www.cdc.gov/steadi/patient.html

## 2023-08-18 NOTE — DISCHARGE NOTE NURSING/CASE MANAGEMENT/SOCIAL WORK - NSDCFUADDAPPT_GEN_ALL_CORE_FT
APPTS ARE READY TO BE MADE: [ X] YES    Best Family or Patient Contact (if needed):    Additional Information about above appointments (if needed):    1: PCP Dr. Carmelo Benavidez  within 1-2 weeks [phone]  2: - Patient will need to follow up with urology as outpatient for ureteroscopy and stent removal  Follow up at :   [facility]  [address] [place] [address]  [phone]  3:     Other comments or requests:

## 2023-08-21 NOTE — CHART NOTE - NSCHARTNOTEFT_GEN_A_CORE
73 M, Yoruba speaking, hx of HLD, HTN, gout, DVT s/p IVC filter, presenting with L flank pain, found to have 3mm obstructing stone in distal L ureter with mild L hydronephrosis. Pt admitted for pain control and management of HERLINDA 2/2 obstructing stone.     Yoruba : [id number]     Patient seen and examined with wife at bedside. States his pain is 10/10, left flank, severe/ sharp    # Left nephrolithiasis.   - CT findings show obstructing 3mm stone with mild hydronephrosis.  - Initial UA showing few calcium oxalate crystals; repeat UA showing large blood   - Urology consulted: to see patient today, currently want patient to remain NPO  - IVF hydration  - increased dose of flomax 0.8 mg HS  - Tylenol PRN for Mild pain, Oxycodone 2.5 Mg PRN mod pain and Oxycodone 5 mg PRN severe pain     #HERLINDA (acute kidney injury).   - SCr 1.60 --> 1.53, likely due to obstructing stone vs hypovolemia from multiple episodes of vomiting prior.   - Trend BMP, SCr (pending results)    # Asymptomatic Bradycardia.   Pt bradycardic to HR 49 at times. Asymptomatic. Not on any silke blocking agents.   - EKG personally reviewed: showing sinus bradycardia, HR 49. No evidence of heart block, No ST or T wave abnormalities.   - d/c telemetry.    #: HTN (hypertension).   - takes Enalapril at home; BP stable   - currently holding ACE-I given HERLINDA   - restart when able    d/w Medicine [facility] jaren
patient seen and examined. no acute complaints  vitals and labs reviewed   bp elevated yesturday - denies pain, enalapril increased - bp improved     73M Swedish speaking with h/o HLD, HTN, gout, DVT s/p IVC filter, presenting with L flank pain, found to have 3mm obstructing stone in distal L ureter with mild L hydronephrosis. Pt admitted for pain control and management of HERLINDA due to obstructing stone. patient s/p left ureteral stent on 8/15 and symptoms improved. With concern for UTI (in OR), UCx was sent and patient was started on ceftriaxone. ID consulted and recommended to continue ceftriaxone until urine culture results. The urine culture was negative and ID recommended to discharge on cefpodoxime for total 5 day course. HERLINDA on admission resolved after stent placement. BP was elevated and patient's enalapril was increased. patient discharge to follow up with pmd and urology.     discharge time - 38 minutes  Dr. M. Luke  Medicine Hospitalist  [employer]
Left 1 message for the patient in regards to follow up care with callback information.

## 2023-08-31 ENCOUNTER — APPOINTMENT (OUTPATIENT)
Dept: UROLOGY | Facility: CLINIC | Age: 73
End: 2023-08-31
Payer: MEDICARE

## 2023-08-31 VITALS
WEIGHT: 168 LBS | OXYGEN SATURATION: 94 % | TEMPERATURE: 97.7 F | SYSTOLIC BLOOD PRESSURE: 108 MMHG | BODY MASS INDEX: 27.99 KG/M2 | HEART RATE: 75 BPM | DIASTOLIC BLOOD PRESSURE: 70 MMHG | HEIGHT: 65 IN

## 2023-08-31 DIAGNOSIS — E78.00 PURE HYPERCHOLESTEROLEMIA, UNSPECIFIED: ICD-10-CM

## 2023-08-31 DIAGNOSIS — Z87.891 PERSONAL HISTORY OF NICOTINE DEPENDENCE: ICD-10-CM

## 2023-08-31 DIAGNOSIS — Z78.9 OTHER SPECIFIED HEALTH STATUS: ICD-10-CM

## 2023-08-31 DIAGNOSIS — N20.0 CALCULUS OF KIDNEY: ICD-10-CM

## 2023-08-31 DIAGNOSIS — N40.0 BENIGN PROSTATIC HYPERPLASIA WITHOUT LOWER URINARY TRACT SYMPMS: ICD-10-CM

## 2023-08-31 DIAGNOSIS — N20.1 CALCULUS OF URETER: ICD-10-CM

## 2023-08-31 DIAGNOSIS — I10 ESSENTIAL (PRIMARY) HYPERTENSION: ICD-10-CM

## 2023-08-31 PROCEDURE — 99214 OFFICE O/P EST MOD 30 MIN: CPT

## 2023-08-31 NOTE — ASSESSMENT
[FreeTextEntry1] : CT reviewed: small stone left distal ureter stent in place.  I had long discussion with patient about their stones, and about options, risks, and benefits of all treatments.  Main options for definitive stone treatment include ESWL, URS.    ESWL success best with smaller, less dense stones, and with short skin to stone distance and favorable location of the stone within the urinary tract, while URS is more successful treatment with multiple stones, more dense stones, or challenging body habitus or stone location.  Non-definitive stone treatment options for drainage, using either stents or nephrostomy, also reviewed: these are of lower immediate surgical risks, but incur multiple procedures to manage and may have their own complications and effects on quality of life.  Still, nephrostomy or nephroureteral catheter can allow maintenance of urinary system drainage without surgical risks, and management in office with exchanges (avoiding the anesthesia and testing which would be present with bilateral internal stent exchange).  Risks of nontreatment with obstruction can lead to very high rate of renal function loss in stone-bearing kidney over the next months to years.  In this patient's case, I recommend... cysto, left ureteroscopy with laser, stone removal, left stent  Risks/benefits/success/recovery expectations all reviewed at length, particularly with respect to patient's comorbidities, and inclusive of infection/sepsis, bleeding, need for secondary procedures or secondary stages such as embolization or open surgery, and even risks of death due to acute issues superimposed on comorbidities.  Pt prefers to undergo: cysto, left ureteroscopy with laser, stone removal, left stent  Will schedule.  Urine culture, no PST appt to schedule once surgery scheduled.

## 2023-08-31 NOTE — HISTORY OF PRESENT ILLNESS
[FreeTextEntry1] :  Language: - Patient/Family of Limited English Proficiency	Yes - Language	Azeri - Please document name/ID of person providing translation, or document patient Stefano  / [id number]    Patient Identity: - Birth Sex	Male   History of Present Illness: Reason for Admission: Nephrolithiasis, HERLINDA History of Present Illness: 73 M, Azeri speaking, hx of HLD, HTN, gout, DVT s/p IVC filter, presenting to ED with complaints of L flank pain for the past 2 days. Pain is sharp and constant, associated with nausea and several episodes of vomiting. Last episode of vomiting was yesterday AM. No blood in vomit. Denies fevers, chest pain, SOB, constipation, diarrhea. lightheadedness. Pt denies smoking or alcohol use. Pt has a history of gout and takes medications for it, but does not remember the names. Gout is localized to big toe on R foot where he has swelling and tenderness of that first joint. No previous instances of kidney stones. Pt says in 2015, he was hospitalized for "blood clots" and required "vein surgery". He is not currently on any blood thinners. Pain is now improved from prior, no longer nauseous.  Had cysto, with plan for left URS, but urine found to be murky- stent placed. Now f/u for definitive   In ED, received morphine, ketorolac, tylenol for pain control; zofran for nausea. CT A/P showed 3mm obstructing stone in distal ureter with associated L mild hydronephrosis.   Review of Systems: Review of Systems: Review of Systems: CONSTITUTIONAL: No fever, weight loss EYES: No eye pain, visual disturbances, or discharge ENMT:  No difficulty hearing, tinnitus, vertigo; No sinus or throat pain RESPIRATORY: No SOB. No cough, wheezing, chills or hemoptysis CARDIOVASCULAR: No chest pain, palpitations, dizziness, or leg swelling GASTROINTESTINAL: L flank pain. + nausea, vomiting (improved), No hematemesis; No diarrhea or constipation. No melena or hematochezia. GENITOURINARY: No dysuria, frequency, hematuria, or incontinence NEUROLOGICAL: No headaches, memory loss, loss of strength, numbness, or tremors SKIN: No itching, burning, rashes, or lesions MUSCULOSKELETAL: +joint pain in base of first toe on R foot with swelling; No muscle, back pain HEME/LYMPH: No easy bruising, or bleeding gums     Allergies and Intolerances:      Allergies: 	No Known Allergies:   Home Medications: * Outpatient Medication Status not yet specified   Patient History: Past Medical, Past Surgical, and Family History: PAST MEDICAL HISTORY: CAD (coronary artery disease)   Gout   HTN (hypertension)   Hyperlipemia.   PAST SURGICAL HISTORY: S/P IVC filter.   FAMILY HISTORY: No pertinent family history in first degree relatives. No pertinent family history of: asthma.   Social History: - Substance use	No - Social History (marital status, living situation, occupation, and sexual history)	Social History:   Marital Status:  ( X  )    (   ) Single    (   )    (  )  Occupation: n/a Lives with: (  ) alone  (  ) children   ( X ) spouse   (  ) parents  (  ) other   Substance Use (street drugs): ( X ) never used  (  ) other: Tobacco Usage:  ( X  ) never smoked   (   ) former smoker   (   ) current smoker  (     ) pack year  (        ) last cigarette date Alcohol Usage: none   [Flank Pain] : flank pain

## 2023-09-08 ENCOUNTER — NON-APPOINTMENT (OUTPATIENT)
Age: 73
End: 2023-09-08

## 2023-09-14 ENCOUNTER — OUTPATIENT (OUTPATIENT)
Dept: OUTPATIENT SERVICES | Facility: HOSPITAL | Age: 73
LOS: 1 days | End: 2023-09-14
Payer: MEDICARE

## 2023-09-14 VITALS
OXYGEN SATURATION: 96 % | SYSTOLIC BLOOD PRESSURE: 151 MMHG | TEMPERATURE: 98 F | DIASTOLIC BLOOD PRESSURE: 80 MMHG | WEIGHT: 167.99 LBS | HEIGHT: 65.75 IN | RESPIRATION RATE: 18 BRPM | HEART RATE: 60 BPM

## 2023-09-14 DIAGNOSIS — Z96.0 PRESENCE OF UROGENITAL IMPLANTS: Chronic | ICD-10-CM

## 2023-09-14 DIAGNOSIS — Z01.818 ENCOUNTER FOR OTHER PREPROCEDURAL EXAMINATION: ICD-10-CM

## 2023-09-14 DIAGNOSIS — N20.1 CALCULUS OF URETER: ICD-10-CM

## 2023-09-14 DIAGNOSIS — Z98.890 OTHER SPECIFIED POSTPROCEDURAL STATES: Chronic | ICD-10-CM

## 2023-09-14 DIAGNOSIS — I10 ESSENTIAL (PRIMARY) HYPERTENSION: ICD-10-CM

## 2023-09-14 DIAGNOSIS — I25.10 ATHEROSCLEROTIC HEART DISEASE OF NATIVE CORONARY ARTERY WITHOUT ANGINA PECTORIS: ICD-10-CM

## 2023-09-14 DIAGNOSIS — Z95.828 PRESENCE OF OTHER VASCULAR IMPLANTS AND GRAFTS: Chronic | ICD-10-CM

## 2023-09-14 LAB
ANION GAP SERPL CALC-SCNC: 11 MMOL/L — SIGNIFICANT CHANGE UP (ref 5–17)
BUN SERPL-MCNC: 25 MG/DL — HIGH (ref 7–23)
CALCIUM SERPL-MCNC: 10.5 MG/DL — SIGNIFICANT CHANGE UP (ref 8.4–10.5)
CHLORIDE SERPL-SCNC: 105 MMOL/L — SIGNIFICANT CHANGE UP (ref 96–108)
CO2 SERPL-SCNC: 23 MMOL/L — SIGNIFICANT CHANGE UP (ref 22–31)
CREAT SERPL-MCNC: 1.04 MG/DL — SIGNIFICANT CHANGE UP (ref 0.5–1.3)
EGFR: 76 ML/MIN/1.73M2 — SIGNIFICANT CHANGE UP
GLUCOSE SERPL-MCNC: 79 MG/DL — SIGNIFICANT CHANGE UP (ref 70–99)
HCT VFR BLD CALC: 45.9 % — SIGNIFICANT CHANGE UP (ref 39–50)
HGB BLD-MCNC: 14.8 G/DL — SIGNIFICANT CHANGE UP (ref 13–17)
MCHC RBC-ENTMCNC: 29.5 PG — SIGNIFICANT CHANGE UP (ref 27–34)
MCHC RBC-ENTMCNC: 32.2 GM/DL — SIGNIFICANT CHANGE UP (ref 32–36)
MCV RBC AUTO: 91.4 FL — SIGNIFICANT CHANGE UP (ref 80–100)
NRBC # BLD: 0 /100 WBCS — SIGNIFICANT CHANGE UP (ref 0–0)
PLATELET # BLD AUTO: 270 K/UL — SIGNIFICANT CHANGE UP (ref 150–400)
POTASSIUM SERPL-MCNC: 4.2 MMOL/L — SIGNIFICANT CHANGE UP (ref 3.5–5.3)
POTASSIUM SERPL-SCNC: 4.2 MMOL/L — SIGNIFICANT CHANGE UP (ref 3.5–5.3)
RBC # BLD: 5.02 M/UL — SIGNIFICANT CHANGE UP (ref 4.2–5.8)
RBC # FLD: 12.2 % — SIGNIFICANT CHANGE UP (ref 10.3–14.5)
SODIUM SERPL-SCNC: 139 MMOL/L — SIGNIFICANT CHANGE UP (ref 135–145)
WBC # BLD: 7.71 K/UL — SIGNIFICANT CHANGE UP (ref 3.8–10.5)
WBC # FLD AUTO: 7.71 K/UL — SIGNIFICANT CHANGE UP (ref 3.8–10.5)

## 2023-09-14 NOTE — H&P PST ADULT - ATTENDING COMMENTS
for cysto, left stent removal, left urs with laser/stone removal, possible left stent replacement  consent reviewed, signed  pt left side marked  pt seen, examined, reviewed all data

## 2023-09-14 NOTE — H&P PST ADULT - NSICDXPASTMEDICALHX_GEN_ALL_CORE_FT
PAST MEDICAL HISTORY:  CAD (coronary artery disease)     Calculus of ureter     Gout     HTN (hypertension)     Hyperlipemia

## 2023-09-14 NOTE — H&P PST ADULT - PROBLEM SELECTOR PLAN 3
patient reports stopped taking 81mg asa 1 month ago after ureter stent was placed  will notify surgeon   does not see cardiac doctor any longer, has not in 7-8 years

## 2023-09-14 NOTE — H&P PST ADULT - ADDITIONAL PE
DASI score: 7.25  DASI activity: walks, does not exercise much due to gout, denies any cardiac symptoms   Loose teeth or denture:

## 2023-09-14 NOTE — H&P PST ADULT - HISTORY OF PRESENT ILLNESS
Patient comes to [facility] for cystoscopy left ureteroscopy with laser stone removal left stent placement with MD Heller on 9/22/23. Patient has a pmhx of HTN, HLD, gout, DVT s/p IVC filter 2015, cad s/p 1 stent 2015.   Patient reports going to emergency room about 1 month ago for left flank pain with nausea and vomiting. Patient had CT scan which showed 3mm obstructing stone in distal ureter with associated left mild hydronephrosis. Patient was admitted in hospital for 5 days. When patient was in hospital had stent placed. Patient followed with surgeon. Patient on arrival denies any pain or discomfort. Patient reports painful urination, hematuria, urinary frequency. Patient denies any other complaints at this time.    Patient reports 1 month ago when stent was placed he stopped 81mg asa. Patient had cardiac stent placed around 2015 pt unsure exact time. patient no longer follows with cardiology, has not in 7-8 years. Patient follows with PCP. Patient sent for medical clearance.

## 2023-09-15 LAB
CULTURE RESULTS: SIGNIFICANT CHANGE UP
SPECIMEN SOURCE: SIGNIFICANT CHANGE UP

## 2023-09-21 ENCOUNTER — TRANSCRIPTION ENCOUNTER (OUTPATIENT)
Age: 73
End: 2023-09-21

## 2023-09-22 ENCOUNTER — TRANSCRIPTION ENCOUNTER (OUTPATIENT)
Age: 73
End: 2023-09-22

## 2023-09-22 ENCOUNTER — OUTPATIENT (OUTPATIENT)
Dept: INPATIENT UNIT | Facility: HOSPITAL | Age: 73
LOS: 1 days | End: 2023-09-22
Payer: MEDICARE

## 2023-09-22 VITALS
HEART RATE: 52 BPM | RESPIRATION RATE: 16 BRPM | OXYGEN SATURATION: 97 % | DIASTOLIC BLOOD PRESSURE: 77 MMHG | SYSTOLIC BLOOD PRESSURE: 138 MMHG | TEMPERATURE: 98 F | WEIGHT: 167.99 LBS | HEIGHT: 65.75 IN

## 2023-09-22 VITALS
HEART RATE: 57 BPM | SYSTOLIC BLOOD PRESSURE: 130 MMHG | TEMPERATURE: 97 F | DIASTOLIC BLOOD PRESSURE: 58 MMHG | RESPIRATION RATE: 16 BRPM | OXYGEN SATURATION: 100 %

## 2023-09-22 DIAGNOSIS — Z96.0 PRESENCE OF UROGENITAL IMPLANTS: Chronic | ICD-10-CM

## 2023-09-22 DIAGNOSIS — Z98.890 OTHER SPECIFIED POSTPROCEDURAL STATES: Chronic | ICD-10-CM

## 2023-09-22 DIAGNOSIS — N20.1 CALCULUS OF URETER: ICD-10-CM

## 2023-09-22 DIAGNOSIS — Z95.828 PRESENCE OF OTHER VASCULAR IMPLANTS AND GRAFTS: Chronic | ICD-10-CM

## 2023-09-22 PROCEDURE — 76000 FLUOROSCOPY <1 HR PHYS/QHP: CPT

## 2023-09-22 PROCEDURE — 87086 URINE CULTURE/COLONY COUNT: CPT

## 2023-09-22 PROCEDURE — 52310 CYSTOSCOPY AND TREATMENT: CPT | Mod: LT

## 2023-09-22 PROCEDURE — G0463: CPT

## 2023-09-22 PROCEDURE — 74420 UROGRAPHY RTRGR +-KUB: CPT | Mod: 26

## 2023-09-22 PROCEDURE — 80048 BASIC METABOLIC PNL TOTAL CA: CPT

## 2023-09-22 PROCEDURE — 85027 COMPLETE CBC AUTOMATED: CPT

## 2023-09-22 PROCEDURE — 52351 CYSTOURETERO & OR PYELOSCOPE: CPT | Mod: LT

## 2023-09-22 RX ORDER — LIDOCAINE HCL 20 MG/ML
0.2 VIAL (ML) INJECTION ONCE
Refills: 0 | Status: DISCONTINUED | OUTPATIENT
Start: 2023-09-22 | End: 2023-09-22

## 2023-09-22 RX ORDER — FENOFIBRATE,MICRONIZED 130 MG
1 CAPSULE ORAL
Refills: 0 | DISCHARGE

## 2023-09-22 RX ORDER — SODIUM CHLORIDE 9 MG/ML
3 INJECTION INTRAMUSCULAR; INTRAVENOUS; SUBCUTANEOUS EVERY 8 HOURS
Refills: 0 | Status: DISCONTINUED | OUTPATIENT
Start: 2023-09-22 | End: 2023-09-22

## 2023-09-22 RX ORDER — HYDROMORPHONE HYDROCHLORIDE 2 MG/ML
0.5 INJECTION INTRAMUSCULAR; INTRAVENOUS; SUBCUTANEOUS
Refills: 0 | Status: DISCONTINUED | OUTPATIENT
Start: 2023-09-22 | End: 2023-09-22

## 2023-09-22 RX ORDER — ATORVASTATIN CALCIUM 80 MG/1
1 TABLET, FILM COATED ORAL
Refills: 0 | DISCHARGE

## 2023-09-22 RX ORDER — ICOSAPENT ETHYL 500 MG/1
2 CAPSULE, LIQUID FILLED ORAL
Refills: 0 | DISCHARGE

## 2023-09-22 RX ORDER — ONDANSETRON 8 MG/1
4 TABLET, FILM COATED ORAL ONCE
Refills: 0 | Status: DISCONTINUED | OUTPATIENT
Start: 2023-09-22 | End: 2023-09-22

## 2023-09-22 RX ORDER — FEBUXOSTAT 40 MG/1
1 TABLET ORAL
Refills: 0 | DISCHARGE

## 2023-09-22 RX ORDER — CEFAZOLIN SODIUM 1 G
2000 VIAL (EA) INJECTION ONCE
Refills: 0 | Status: COMPLETED | OUTPATIENT
Start: 2023-09-22 | End: 2023-09-22

## 2023-09-22 RX ORDER — SODIUM CHLORIDE 9 MG/ML
1000 INJECTION, SOLUTION INTRAVENOUS
Refills: 0 | Status: DISCONTINUED | OUTPATIENT
Start: 2023-09-22 | End: 2023-10-06

## 2023-09-22 NOTE — ASU PATIENT PROFILE, ADULT - FALL HARM RISK - UNIVERSAL INTERVENTIONS
Bed in lowest position, wheels locked, appropriate side rails in place/Call bell, personal items and telephone in reach/Instruct patient to call for assistance before getting out of bed or chair/Non-slip footwear when patient is out of bed/[place] to call system/Physically safe environment - no spills, clutter or unnecessary equipment/Purposeful Proactive Rounding/Room/bathroom lighting operational, light cord in reach

## 2023-09-22 NOTE — ASU DISCHARGE PLAN (ADULT/PEDIATRIC) - CARE PROVIDER_API CALL
Hoenig, David Mayer  Urology  [address] [place] [id number]  Phone: [phone]  Fax: [phone]  Follow Up Time:

## 2023-09-22 NOTE — ASU DISCHARGE PLAN (ADULT/PEDIATRIC) - ASU DC SPECIAL INSTRUCTIONSFT
Your stent was removed , you might still see some blood in the urine. That is expected, please ensure that you adequately hydrate your self by drinking at least 8 glasses of fluid a day.

## 2023-09-22 NOTE — PRE-ANESTHESIA EVALUATION ADULT - NSANTHPMHFT_GEN_ALL_CORE
72 y/o M PMH HTN, HLD, gout, DVT s/p IVC filter 2015, CAD (s/p 1 stent 2015) to undergo cystoscopy left ureteroscopy with laser stone removal, left stent.

## 2023-09-29 ENCOUNTER — APPOINTMENT (OUTPATIENT)
Dept: UROLOGY | Facility: CLINIC | Age: 73
End: 2023-09-29

## 2024-04-24 PROBLEM — M10.9 GOUT, UNSPECIFIED: Chronic | Status: ACTIVE | Noted: 2023-08-14

## 2024-04-24 PROBLEM — I25.10 ATHEROSCLEROTIC HEART DISEASE OF NATIVE CORONARY ARTERY WITHOUT ANGINA PECTORIS: Chronic | Status: ACTIVE | Noted: 2023-08-13

## 2024-04-24 PROBLEM — E78.5 HYPERLIPIDEMIA, UNSPECIFIED: Chronic | Status: ACTIVE | Noted: 2023-08-13

## 2024-04-24 PROBLEM — N20.1 CALCULUS OF URETER: Chronic | Status: ACTIVE | Noted: 2023-09-14

## 2024-04-24 PROBLEM — I10 ESSENTIAL (PRIMARY) HYPERTENSION: Chronic | Status: ACTIVE | Noted: 2023-08-13

## 2024-05-01 ENCOUNTER — APPOINTMENT (OUTPATIENT)
Dept: HEART AND VASCULAR | Facility: CLINIC | Age: 74
End: 2024-05-01
Payer: MEDICARE

## 2024-05-01 VITALS
DIASTOLIC BLOOD PRESSURE: 75 MMHG | SYSTOLIC BLOOD PRESSURE: 126 MMHG | WEIGHT: 165.34 LBS | HEART RATE: 62 BPM | HEIGHT: 65 IN | BODY MASS INDEX: 27.55 KG/M2 | RESPIRATION RATE: 16 BRPM

## 2024-05-01 DIAGNOSIS — R07.89 OTHER CHEST PAIN: ICD-10-CM

## 2024-05-01 PROCEDURE — 99204 OFFICE O/P NEW MOD 45 MIN: CPT

## 2024-05-01 PROCEDURE — G2211 COMPLEX E/M VISIT ADD ON: CPT

## 2024-05-01 NOTE — HISTORY OF PRESENT ILLNESS
[FreeTextEntry1] : 75 yo man PMHx of tobacco use disorder, HTN, HLD, and DVT s/p thrombectomy and IVC filter placement who is here as a new patient.  ROS + intermittent left substernal chest pain at rest. No dyspnea or palpitations.  PMHx/PSHx as above FMHx no CV disease Social hx: tobacco use

## 2024-05-01 NOTE — DISCUSSION/SUMMARY
[FreeTextEntry1] : 75 yo man PMHx of tobacco use disorder, HTN, HLD, and DVT s/p thrombectomy and IVC filter placement who is here as a new patient.  Assessment 1. Possibly cardiac chest pain Left heart cath July/2013 at [facility] record obtained from EMR -> all coronary arteries were healthy at that time 2. DVT s/p thrombectomy and IVC filter placement ~10y ago 3. HTN 4. HLD  Plan 1. Obtain CT coronary w/ IV contrast 2. If confirmed to not have CAD, can stop ASA 81mg PO QD 3. Enalapril at current dose 4. High intensity statin  5. RTC 1mo  During non face-to-face time, I reviewed relevant portions of the patient's medical record. During face-to-face time, I took a relevant history and examined the patient. I also explained differential diagnoses, relevant cardiac diagnoses, workup, and management plan, which required a moderate level of medical decision making. I answered all questions related to the patient's medical conditions.   Vega Sutton M.D. Attending Cardiologist [facility]

## 2024-06-12 ENCOUNTER — APPOINTMENT (OUTPATIENT)
Dept: HEART AND VASCULAR | Facility: CLINIC | Age: 74
End: 2024-06-12

## 2024-06-19 ENCOUNTER — APPOINTMENT (OUTPATIENT)
Dept: HEART AND VASCULAR | Facility: CLINIC | Age: 74
End: 2024-06-19
Payer: MEDICARE

## 2024-06-19 VITALS
HEART RATE: 61 BPM | SYSTOLIC BLOOD PRESSURE: 126 MMHG | WEIGHT: 165 LBS | HEIGHT: 65 IN | RESPIRATION RATE: 16 BRPM | DIASTOLIC BLOOD PRESSURE: 65 MMHG | BODY MASS INDEX: 27.49 KG/M2

## 2024-06-19 DIAGNOSIS — I35.9 NONRHEUMATIC AORTIC VALVE DISORDER, UNSPECIFIED: ICD-10-CM

## 2024-06-19 PROCEDURE — G2211 COMPLEX E/M VISIT ADD ON: CPT

## 2024-06-19 PROCEDURE — 99214 OFFICE O/P EST MOD 30 MIN: CPT

## 2024-06-19 NOTE — HISTORY OF PRESENT ILLNESS
[FreeTextEntry1] : 73 yo man PMHx of tobacco use disorder, HTN, HLD, and DVT s/p thrombectomy and IVC filter placement who is here as a follow up.  06/19/24 No new CP or dyspnea since the last visit  05/01/24 ROS + intermittent left substernal chest pain at rest. No dyspnea or palpitations.

## 2024-06-19 NOTE — DISCUSSION/SUMMARY
[FreeTextEntry1] : 73 yo man PMHx of tobacco use disorder, HTN, HLD, and DVT s/p thrombectomy and IVC filter placement who is here as a follow up.  Assessment 1. Possibly cardiac chest pain Left heart cath July/2013 at [facility] record obtained from EMR -> all coronary arteries were healthy at that time CT coronary IV contrast June/2024 Ca score 0, no evidence of CAD 2. Anterior mediastinal nodule seen on CT heart  3. Aortic valve calcification seen on CT heart 4. Pulmonary nodule seen on CT heart 5. DVT s/p thrombectomy and IVC filter placement ~10y ago 6. HTN 7. HLD  Plan 1. No evidence of CAD so no need to continue ASA  2. PCP Dr. Benavidez discussed with the patient about anterior mediastnal nodule -> getting MRI  3. Will obtain TTE given aortic valve calcification on CT 4. To follow with PCP for surveillance of pulmonary nodule with annual CT 5. Enalapril at current dose for HTN 6. High intensity statin  7. RTC 1mo after TTE  During non face-to-face time, I reviewed relevant portions of the patient's medical record. During face-to-face time, I took a relevant history and examined the patient. I also explained differential diagnoses, relevant cardiac diagnoses, workup, and management plan, which required a moderate level of medical decision making. I answered all questions related to the patient's medical conditions.   Vega Sutton M.D. Attending Cardiologist [facility]

## 2024-07-02 NOTE — ED PROVIDER NOTE - NS ED MD DISPO ISOLATION TYPES
Attempted to call 3x- phone hangs up each time. Addl attempts will be made to contact [facility] and schedule medical oncology consult.    None

## 2024-07-08 ENCOUNTER — APPOINTMENT (OUTPATIENT)
Dept: CARDIOLOGY | Facility: CLINIC | Age: 74
End: 2024-07-08
Payer: MEDICARE

## 2024-07-08 VITALS — DIASTOLIC BLOOD PRESSURE: 75 MMHG | SYSTOLIC BLOOD PRESSURE: 123 MMHG

## 2024-07-08 PROCEDURE — 93306 TTE W/DOPPLER COMPLETE: CPT

## 2024-07-24 ENCOUNTER — APPOINTMENT (OUTPATIENT)
Dept: HEART AND VASCULAR | Facility: CLINIC | Age: 74
End: 2024-07-24
Payer: MEDICARE

## 2024-07-24 VITALS
DIASTOLIC BLOOD PRESSURE: 67 MMHG | SYSTOLIC BLOOD PRESSURE: 114 MMHG | HEIGHT: 65 IN | HEART RATE: 79 BPM | WEIGHT: 165 LBS | BODY MASS INDEX: 27.49 KG/M2 | RESPIRATION RATE: 16 BRPM

## 2024-07-24 DIAGNOSIS — I35.8 OTHER NONRHEUMATIC AORTIC VALVE DISORDERS: ICD-10-CM

## 2024-07-24 PROCEDURE — 99215 OFFICE O/P EST HI 40 MIN: CPT

## 2024-07-24 PROCEDURE — G2211 COMPLEX E/M VISIT ADD ON: CPT

## 2024-07-24 NOTE — DISCUSSION/SUMMARY
[FreeTextEntry1] : 75 yo man PMHx of tobacco use disorder, HTN, HLD, and DVT s/p thrombectomy and IVC filter placement who is here as a follow up.  Assessment 1. Possibly cardiac chest pain Left heart cath July/2013 at [facility] record obtained from EMR -> all coronary arteries were healthy at that time CT coronary IV contrast June/2024 Ca score 0, no evidence of CAD 2. Anterior mediastinal nodule seen on CT heart -> MRI chest w/ R anterior mediastinal benign cyst 3. Aortic valve calcification seen on CT heart -> TTE w/ aortic valve mass? and mild-mod AI 4. Pulmonary nodule seen on CT heart -> PCP aware and to obtain surveillance CT chest 5. DVT s/p thrombectomy and IVC filter placement ~10y ago 6. HTN 7. HLD  Plan 1. No evidence of CAD so no need to continue ASA  2. TTE done given aortic valve calcification on CT -> mild-mod aortic regurgitation, possible aortic mass? -> obtain cardiac MRI 3. Enalapril at current dose for HTN 4. High intensity statin  5. RTC 1mo after cardiac MRI  During non face-to-face time, I reviewed relevant portions of the patient's medical record. During face-to-face time, I took a relevant history and examined the patient. I also explained differential diagnoses, relevant cardiac diagnoses, workup, and management plan. Total time spent 40 min.   Vega Sutton M.D. Attending Cardiologist [facility]

## 2024-07-24 NOTE — HISTORY OF PRESENT ILLNESS
[FreeTextEntry1] : 73 yo man PMHx of tobacco use disorder, HTN, HLD, and DVT s/p thrombectomy and IVC filter placement who is here as a follow up.  07/24/24 ROS negative 06/19/24 No new CP or dyspnea since the last visit  05/01/24 ROS + intermittent left substernal chest pain at rest. No dyspnea or palpitations.

## 2024-09-09 ENCOUNTER — APPOINTMENT (OUTPATIENT)
Dept: MRI IMAGING | Facility: HOSPITAL | Age: 74
End: 2024-09-09

## 2024-09-18 ENCOUNTER — APPOINTMENT (OUTPATIENT)
Dept: HEART AND VASCULAR | Facility: CLINIC | Age: 74
End: 2024-09-18
Payer: MEDICARE

## 2024-09-18 VITALS
DIASTOLIC BLOOD PRESSURE: 70 MMHG | BODY MASS INDEX: 27.49 KG/M2 | SYSTOLIC BLOOD PRESSURE: 140 MMHG | RESPIRATION RATE: 16 BRPM | HEIGHT: 65 IN | HEART RATE: 62 BPM | WEIGHT: 165 LBS

## 2024-09-18 PROCEDURE — 99215 OFFICE O/P EST HI 40 MIN: CPT

## 2024-09-18 NOTE — DISCUSSION/SUMMARY
[FreeTextEntry1] : 75 yo man PMHx of tobacco use disorder, mild-moderate AI, HTN, HLD, and DVT s/p thrombectomy and IVC filter placement who is here as a follow up.  Assessment 1. Non cardiac chest pain Left heart cath July/2013 at [facility] -> no CAD CT coronary IV contrast June/2024 Ca score 0, no evidence of CAD 2. Anterior mediastinal nodule seen on CT heart -> MRI chest w/ R anterior mediastinal benign cyst 3. Aortic valve calcification seen on CT heart -> TTE w/ aortic valve mass? and mild-mod AI -> cMRI negative for any vegetation or aortic valve mass, has mild AI 4. Pulmonary nodule seen on CT heart -> PCP aware and to obtain surveillance CT chest 5. DVT s/p thrombectomy and IVC filter placement ~10y ago 6. HTN 7. HLD  Plan 1. No evidence of CAD so no need for ASA 2. Cardiac MRI w/o obvious aortic valve vegetation or mass, only has mild AI, likely TTE was finding was nonspecific, patient has no clinical concern for cardiac mass/vegetation or IE 3. Enalapril at current dose for HTN 4. High intensity statin  5. No concern for acute coronary syndrome, decompensated heart failure, severe aortic stenosis, or ventricular tachyarrhythmia. METS>4. No further cardiac workup or intervention needed prior to the scheduled prostate biopsy/surgery/procedure. Low vini-operative cardiac risk. Will fax to Dr. Wyatt Ruiz. 6. Repeat TTE in 6mo  During non face-to-face time, I reviewed relevant portions of the patient's medical record. During face-to-face time, I took a relevant history and examined the patient. I also explained differential diagnoses, relevant cardiac diagnoses, workup, and management plan. Total time spent 40 min.   Vega Sutton M.D. Attending Cardiologist [facility]

## 2024-09-18 NOTE — HISTORY OF PRESENT ILLNESS
[FreeTextEntry1] : 75 yo man PMHx of tobacco use disorder, mild-moderate AI, HTN, HLD, and DVT s/p thrombectomy and IVC filter placement who is here as a follow up.  09/18/24 ROS negative for any chest pain, dyspnea, or other cardiac sx. No fever or chills. Recently got diagnosed with possible prostate malignancy. 07/24/24 ROS negative 06/19/24 No new CP or dyspnea since the last visit  05/01/24 ROS + intermittent left substernal chest pain at rest. No dyspnea or palpitations.

## 2024-11-25 NOTE — PATIENT PROFILE ADULT - FUNCTIONAL ASSESSMENT - BASIC MOBILITY 6.
The patient contacted the office ,requesting a script for the Adderal XR 20 mg capsules to be sent to the [facility] pharmacy in [place]    4 = No assist / stand by assistance

## (undated) DEVICE — IRR BULB PATHFINDER + 10"

## (undated) DEVICE — GLV 7.5 PROTEXIS (WHITE)

## (undated) DEVICE — TUBING RANGER FLUID IRRIGATION SET DISP

## (undated) DEVICE — Device

## (undated) DEVICE — WARMING BLANKET UPPER ADULT

## (undated) DEVICE — SOL IRR POUR H2O 1500ML

## (undated) DEVICE — SOL IRR BAG H2O 3000ML

## (undated) DEVICE — SOL IRR BAG NS 0.9% 3000ML

## (undated) DEVICE — DRAPE EQUIPMENT BANDED BAG 30 X 30" (SHOWER CAP)

## (undated) DEVICE — DRAPE DRAINAGE BAG RELAX & GEMINI

## (undated) DEVICE — PACK CYSTO

## (undated) DEVICE — VENODYNE/SCD SLEEVE CALF LARGE

## (undated) DEVICE — POSITIONER FOAM HEADREST (PINK)

## (undated) DEVICE — POSITIONER FOAM EGG CRATE ULNAR 2PCS (PINK)

## (undated) DEVICE — GLV 7 PROTEXIS (WHITE)

## (undated) DEVICE — BAG URINE W METER 2L

## (undated) DEVICE — SYR LUER LOK 10CC

## (undated) DEVICE — BOSTON SCIENTIFC PUMPING SYSTEM SAPS SINGLE ACTION 10CC

## (undated) DEVICE — GLV 8 PROTEXIS (CREAM) NEU-THERA

## (undated) DEVICE — GLV 6.5 PROTEXIS (WHITE)

## (undated) DEVICE — TUBING SUCTION 20FT

## (undated) DEVICE — SPECIMEN CONTAINER 100ML

## (undated) DEVICE — ADAPTER CHECK FLO 9FR STERILE

## (undated) DEVICE — GLV 8 PROTEXIS (WHITE)

## (undated) DEVICE — DRAPE COVER DOME COVEX 27"

## (undated) DEVICE — GOWN TRIMAX LG

## (undated) DEVICE — FOLEY HOLDER STATLOCK 2 WAY ADULT

## (undated) DEVICE — ACMI SELF-SEALING SEAL UP TO 7FR

## (undated) DEVICE — PRESSURE INFUSOR BAG 3000ML